# Patient Record
Sex: FEMALE | Race: WHITE | NOT HISPANIC OR LATINO | Employment: OTHER
[De-identification: names, ages, dates, MRNs, and addresses within clinical notes are randomized per-mention and may not be internally consistent; named-entity substitution may affect disease eponyms.]

---

## 2017-01-12 ENCOUNTER — RECORDS - HEALTHEAST (OUTPATIENT)
Dept: ADMINISTRATIVE | Facility: OTHER | Age: 62
End: 2017-01-12

## 2017-01-13 ENCOUNTER — HOSPITAL ENCOUNTER (OUTPATIENT)
Dept: NUCLEAR MEDICINE | Facility: HOSPITAL | Age: 62
Discharge: HOME | End: 2017-01-13
Attending: FAMILY MEDICINE

## 2017-01-13 ENCOUNTER — HOSPITAL ENCOUNTER (OUTPATIENT)
Dept: CARDIOLOGY | Facility: HOSPITAL | Age: 62
Discharge: HOME | End: 2017-01-13
Attending: FAMILY MEDICINE

## 2017-01-13 DIAGNOSIS — R06.02 EXERCISE-INDUCED SHORTNESS OF BREATH: ICD-10-CM

## 2017-01-13 LAB
CV STRESS CURRENT BP HE: NORMAL
CV STRESS CURRENT HR HE: 101
CV STRESS CURRENT HR HE: 101
CV STRESS CURRENT HR HE: 113
CV STRESS CURRENT HR HE: 115
CV STRESS CURRENT HR HE: 116
CV STRESS CURRENT HR HE: 121
CV STRESS CURRENT HR HE: 123
CV STRESS CURRENT HR HE: 127
CV STRESS CURRENT HR HE: 127
CV STRESS CURRENT HR HE: 137
CV STRESS CURRENT HR HE: 138
CV STRESS CURRENT HR HE: 144
CV STRESS CURRENT HR HE: 144
CV STRESS CURRENT HR HE: 148
CV STRESS CURRENT HR HE: 148
CV STRESS CURRENT HR HE: 151
CV STRESS CURRENT HR HE: 152
CV STRESS CURRENT HR HE: 152
CV STRESS CURRENT HR HE: 89
CV STRESS CURRENT HR HE: 89
CV STRESS CURRENT HR HE: 93
CV STRESS CURRENT HR HE: 93
CV STRESS CURRENT HR HE: 94
CV STRESS CURRENT HR HE: 96
CV STRESS CURRENT HR HE: 96
CV STRESS CURRENT HR HE: 98
CV STRESS DEVIATION TIME HE: NORMAL
CV STRESS EXERCISE STAGE HE: NORMAL
CV STRESS EXERCISE STAGE REACHED HE: NORMAL
CV STRESS FINAL RESTING BP HE: NORMAL
CV STRESS FINAL RESTING HR HE: 93
CV STRESS MAX TREADMILL GRADE HE: 12
CV STRESS MAX TREADMILL SPEED HE: 2.5
CV STRESS PEAK DIA BP HE: NORMAL
CV STRESS PEAK SYS BP HE: NORMAL
CV STRESS PHASE HE: NORMAL
CV STRESS PROTOCOL HE: NORMAL
CV STRESS RESTING PT POSITION HE: NORMAL
CV STRESS RESTING PT POSITION HE: NORMAL
CV STRESS ST DEVIATION AMOUNT HE: NORMAL
CV STRESS ST DEVIATION ELEVATION HE: NORMAL
CV STRESS ST EVELATION AMOUNT HE: NORMAL
CV STRESS TEST TYPE HE: NORMAL
CV STRESS TOTAL STAGE TIME MIN 1 HE: NORMAL
NUC STRESS EJECTION FRACTION: 76 %
STRESS ECHO BASELINE BP: NORMAL
STRESS ECHO BASELINE HR: 96
STRESS ECHO CALCULATED PERCENT HR: 95
STRESS ECHO LAST STRESS BP: NORMAL
STRESS ECHO LAST STRESS HR: 152
STRESS ECHO POST ESTIMATED WORKLOAD: 6.2
STRESS ECHO POST EXERCISE DUR MIN: 4.24
STRESS ECHO TARGET HR: 137

## 2017-01-13 RX ORDER — ATORVASTATIN CALCIUM 10 MG/1
10 TABLET, FILM COATED ORAL AT BEDTIME
Status: SHIPPED | COMMUNITY
Start: 2017-01-13 | End: 2022-06-16

## 2017-01-13 RX ORDER — ALBUTEROL SULFATE 90 UG/1
2 AEROSOL, METERED RESPIRATORY (INHALATION) EVERY 6 HOURS PRN
Status: SHIPPED | COMMUNITY
Start: 2017-01-13 | End: 2022-06-16

## 2019-09-04 ENCOUNTER — RECORDS - HEALTHEAST (OUTPATIENT)
Dept: LAB | Facility: CLINIC | Age: 64
End: 2019-09-04

## 2019-09-04 LAB
ALBUMIN SERPL-MCNC: 3.9 G/DL (ref 3.5–5)
ALP SERPL-CCNC: 54 U/L (ref 45–120)
ALT SERPL W P-5'-P-CCNC: 25 U/L (ref 0–45)
ANION GAP SERPL CALCULATED.3IONS-SCNC: 12 MMOL/L (ref 5–18)
AST SERPL W P-5'-P-CCNC: 30 U/L (ref 0–40)
BILIRUB SERPL-MCNC: 0.4 MG/DL (ref 0–1)
BUN SERPL-MCNC: 9 MG/DL (ref 8–22)
CALCIUM SERPL-MCNC: 9.2 MG/DL (ref 8.5–10.5)
CHLORIDE BLD-SCNC: 107 MMOL/L (ref 98–107)
CHOLEST SERPL-MCNC: 262 MG/DL
CO2 SERPL-SCNC: 22 MMOL/L (ref 22–31)
CREAT SERPL-MCNC: 0.83 MG/DL (ref 0.6–1.1)
FASTING STATUS PATIENT QL REPORTED: NO
GFR SERPL CREATININE-BSD FRML MDRD: >60 ML/MIN/1.73M2
GLUCOSE BLD-MCNC: 103 MG/DL (ref 70–125)
HDLC SERPL-MCNC: 88 MG/DL
LDLC SERPL CALC-MCNC: 126 MG/DL
POTASSIUM BLD-SCNC: 4.2 MMOL/L (ref 3.5–5)
PROT SERPL-MCNC: 6.6 G/DL (ref 6–8)
SODIUM SERPL-SCNC: 141 MMOL/L (ref 136–145)
T4 FREE SERPL-MCNC: 0.9 NG/DL (ref 0.7–1.8)
TRIGL SERPL-MCNC: 240 MG/DL
TSH SERPL DL<=0.005 MIU/L-ACNC: 2.14 UIU/ML (ref 0.3–5)

## 2019-09-05 ENCOUNTER — RECORDS - HEALTHEAST (OUTPATIENT)
Dept: LAB | Facility: CLINIC | Age: 64
End: 2019-09-05

## 2019-09-05 LAB — LIPASE SERPL-CCNC: 30 U/L (ref 0–52)

## 2020-02-11 ENCOUNTER — RECORDS - HEALTHEAST (OUTPATIENT)
Dept: ADMINISTRATIVE | Facility: OTHER | Age: 65
End: 2020-02-11

## 2020-02-12 ENCOUNTER — HOSPITAL ENCOUNTER (OUTPATIENT)
Dept: MAMMOGRAPHY | Facility: CLINIC | Age: 65
Discharge: HOME | End: 2020-02-12
Attending: PHYSICIAN ASSISTANT

## 2020-02-12 DIAGNOSIS — N63.42 UNSPECIFIED LUMP IN LEFT BREAST, SUBAREOLAR: ICD-10-CM

## 2020-02-12 DIAGNOSIS — N63.10 LUMP OF RIGHT BREAST: ICD-10-CM

## 2020-02-14 ENCOUNTER — HOSPITAL ENCOUNTER (OUTPATIENT)
Dept: MAMMOGRAPHY | Facility: CLINIC | Age: 65
Discharge: HOME | End: 2020-02-14
Attending: PHYSICIAN ASSISTANT

## 2020-02-14 DIAGNOSIS — N63.42 UNSPECIFIED LUMP IN LEFT BREAST, SUBAREOLAR: ICD-10-CM

## 2020-02-14 DIAGNOSIS — N63.10 LUMP OF RIGHT BREAST: ICD-10-CM

## 2021-05-28 ENCOUNTER — RECORDS - HEALTHEAST (OUTPATIENT)
Dept: ADMINISTRATIVE | Facility: CLINIC | Age: 66
End: 2021-05-28

## 2021-10-26 ENCOUNTER — LAB REQUISITION (OUTPATIENT)
Dept: LAB | Facility: CLINIC | Age: 66
End: 2021-10-26
Payer: MEDICARE

## 2021-10-26 DIAGNOSIS — E78.2 MIXED HYPERLIPIDEMIA: ICD-10-CM

## 2021-10-26 DIAGNOSIS — H61.21 IMPACTED CERUMEN, RIGHT EAR: ICD-10-CM

## 2021-10-26 DIAGNOSIS — Z01.419 ENCOUNTER FOR GYNECOLOGICAL EXAMINATION (GENERAL) (ROUTINE) WITHOUT ABNORMAL FINDINGS: ICD-10-CM

## 2021-10-26 LAB
ALBUMIN SERPL-MCNC: 3.9 G/DL (ref 3.5–5)
ALP SERPL-CCNC: 81 U/L (ref 45–120)
ALT SERPL W P-5'-P-CCNC: 23 U/L (ref 0–45)
ANION GAP SERPL CALCULATED.3IONS-SCNC: 14 MMOL/L (ref 5–18)
AST SERPL W P-5'-P-CCNC: 31 U/L (ref 0–40)
BILIRUB SERPL-MCNC: 0.4 MG/DL (ref 0–1)
BUN SERPL-MCNC: 12 MG/DL (ref 8–22)
CALCIUM SERPL-MCNC: 9.9 MG/DL (ref 8.5–10.5)
CHLORIDE BLD-SCNC: 103 MMOL/L (ref 98–107)
CHOLEST SERPL-MCNC: 267 MG/DL
CO2 SERPL-SCNC: 24 MMOL/L (ref 22–31)
CREAT SERPL-MCNC: 0.75 MG/DL (ref 0.6–1.1)
ERYTHROCYTE [DISTWIDTH] IN BLOOD BY AUTOMATED COUNT: 12.2 % (ref 10–15)
FASTING STATUS PATIENT QL REPORTED: NO
FERRITIN SERPL-MCNC: 456 NG/ML (ref 10–130)
GFR SERPL CREATININE-BSD FRML MDRD: 85 ML/MIN/1.73M2
GLUCOSE BLD-MCNC: 121 MG/DL (ref 70–125)
HCT VFR BLD AUTO: 42 % (ref 35–47)
HDLC SERPL-MCNC: 83 MG/DL
HGB BLD-MCNC: 13.7 G/DL (ref 11.7–15.7)
LDLC SERPL CALC-MCNC: 164 MG/DL
MCH RBC QN AUTO: 31.9 PG (ref 26.5–33)
MCHC RBC AUTO-ENTMCNC: 32.6 G/DL (ref 31.5–36.5)
MCV RBC AUTO: 98 FL (ref 78–100)
PLATELET # BLD AUTO: 335 10E3/UL (ref 150–450)
POTASSIUM BLD-SCNC: 4.5 MMOL/L (ref 3.5–5)
PROT SERPL-MCNC: 7.3 G/DL (ref 6–8)
RBC # BLD AUTO: 4.3 10E6/UL (ref 3.8–5.2)
SODIUM SERPL-SCNC: 141 MMOL/L (ref 136–145)
T4 FREE SERPL-MCNC: 0.85 NG/DL (ref 0.7–1.8)
TRIGL SERPL-MCNC: 102 MG/DL
TSH SERPL DL<=0.005 MIU/L-ACNC: 2.78 UIU/ML (ref 0.3–5)
WBC # BLD AUTO: 8.1 10E3/UL (ref 4–11)

## 2021-10-26 PROCEDURE — 80053 COMPREHEN METABOLIC PANEL: CPT | Mod: ORL | Performed by: NURSE PRACTITIONER

## 2021-10-26 PROCEDURE — 87624 HPV HI-RISK TYP POOLED RSLT: CPT | Mod: ORL | Performed by: NURSE PRACTITIONER

## 2021-10-26 PROCEDURE — 84443 ASSAY THYROID STIM HORMONE: CPT | Mod: ORL | Performed by: NURSE PRACTITIONER

## 2021-10-26 PROCEDURE — 85027 COMPLETE CBC AUTOMATED: CPT | Mod: ORL | Performed by: NURSE PRACTITIONER

## 2021-10-26 PROCEDURE — 84439 ASSAY OF FREE THYROXINE: CPT | Mod: ORL | Performed by: NURSE PRACTITIONER

## 2021-10-26 PROCEDURE — G0123 SCREEN CERV/VAG THIN LAYER: HCPCS | Mod: ORL | Performed by: NURSE PRACTITIONER

## 2021-10-26 PROCEDURE — 82728 ASSAY OF FERRITIN: CPT | Mod: ORL | Performed by: NURSE PRACTITIONER

## 2021-10-26 PROCEDURE — 80061 LIPID PANEL: CPT | Mod: ORL | Performed by: NURSE PRACTITIONER

## 2021-11-01 LAB
BKR LAB AP GYN ADEQUACY: NORMAL
BKR LAB AP GYN INTERPRETATION: NORMAL
BKR LAB AP HPV REFLEX: NORMAL
BKR LAB AP PREVIOUS ABNORMAL: NORMAL
HUMAN PAPILLOMA VIRUS 16 DNA: NEGATIVE
HUMAN PAPILLOMA VIRUS 18 DNA: NEGATIVE
HUMAN PAPILLOMA VIRUS FINAL DIAGNOSIS: ABNORMAL
HUMAN PAPILLOMA VIRUS OTHER HR: POSITIVE
PATH REPORT.COMMENTS IMP SPEC: NORMAL
PATH REPORT.RELEVANT HX SPEC: NORMAL

## 2022-06-16 ENCOUNTER — APPOINTMENT (OUTPATIENT)
Dept: CT IMAGING | Facility: HOSPITAL | Age: 67
End: 2022-06-16
Attending: EMERGENCY MEDICINE
Payer: MEDICARE

## 2022-06-16 ENCOUNTER — HOSPITAL ENCOUNTER (OUTPATIENT)
Facility: HOSPITAL | Age: 67
Setting detail: OBSERVATION
LOS: 1 days | Discharge: HOME | End: 2022-06-17
Attending: EMERGENCY MEDICINE | Admitting: INTERNAL MEDICINE
Payer: MEDICARE

## 2022-06-16 DIAGNOSIS — T17.908A ASPIRATION OF FOREIGN BODY, INITIAL ENCOUNTER: Primary | ICD-10-CM

## 2022-06-16 DIAGNOSIS — T17.908A ASPIRATION INTO AIRWAY, INITIAL ENCOUNTER: ICD-10-CM

## 2022-06-16 LAB
ANION GAP SERPL CALCULATED.3IONS-SCNC: 11 MMOL/L (ref 5–18)
BASOPHILS # BLD AUTO: 0 10E3/UL (ref 0–0.2)
BASOPHILS NFR BLD AUTO: 1 %
BUN SERPL-MCNC: 9 MG/DL (ref 8–22)
CALCIUM SERPL-MCNC: 9.5 MG/DL (ref 8.5–10.5)
CHLORIDE BLD-SCNC: 104 MMOL/L (ref 98–107)
CO2 SERPL-SCNC: 26 MMOL/L (ref 22–31)
CREAT SERPL-MCNC: 0.76 MG/DL (ref 0.6–1.1)
EOSINOPHIL # BLD AUTO: 0 10E3/UL (ref 0–0.7)
EOSINOPHIL NFR BLD AUTO: 1 %
ERYTHROCYTE [DISTWIDTH] IN BLOOD BY AUTOMATED COUNT: 12.5 % (ref 10–15)
GFR SERPL CREATININE-BSD FRML MDRD: 87 ML/MIN/1.73M2
GLUCOSE BLD-MCNC: 105 MG/DL (ref 70–125)
HCT VFR BLD AUTO: 43.1 % (ref 35–47)
HGB BLD-MCNC: 14 G/DL (ref 11.7–15.7)
IMM GRANULOCYTES # BLD: 0 10E3/UL
IMM GRANULOCYTES NFR BLD: 1 %
INR PPP: 0.98 (ref 0.85–1.15)
LYMPHOCYTES # BLD AUTO: 1.5 10E3/UL (ref 0.8–5.3)
LYMPHOCYTES NFR BLD AUTO: 22 %
MCH RBC QN AUTO: 31.3 PG (ref 26.5–33)
MCHC RBC AUTO-ENTMCNC: 32.5 G/DL (ref 31.5–36.5)
MCV RBC AUTO: 96 FL (ref 78–100)
MONOCYTES # BLD AUTO: 0.6 10E3/UL (ref 0–1.3)
MONOCYTES NFR BLD AUTO: 9 %
NEUTROPHILS # BLD AUTO: 4.6 10E3/UL (ref 1.6–8.3)
NEUTROPHILS NFR BLD AUTO: 66 %
NRBC # BLD AUTO: 0 10E3/UL
NRBC BLD AUTO-RTO: 0 /100
PLATELET # BLD AUTO: 298 10E3/UL (ref 150–450)
POTASSIUM BLD-SCNC: 4.2 MMOL/L (ref 3.5–5)
RBC # BLD AUTO: 4.48 10E6/UL (ref 3.8–5.2)
SARS-COV-2 RNA RESP QL NAA+PROBE: NEGATIVE
SODIUM SERPL-SCNC: 141 MMOL/L (ref 136–145)
WBC # BLD AUTO: 6.8 10E3/UL (ref 4–11)

## 2022-06-16 PROCEDURE — 85610 PROTHROMBIN TIME: CPT | Performed by: EMERGENCY MEDICINE

## 2022-06-16 PROCEDURE — G0378 HOSPITAL OBSERVATION PER HR: HCPCS

## 2022-06-16 PROCEDURE — 85025 COMPLETE CBC W/AUTO DIFF WBC: CPT | Performed by: EMERGENCY MEDICINE

## 2022-06-16 PROCEDURE — 250N000011 HC RX IP 250 OP 636: Performed by: EMERGENCY MEDICINE

## 2022-06-16 PROCEDURE — 71250 CT THORAX DX C-: CPT

## 2022-06-16 PROCEDURE — 99219 PR INITIAL OBSERVATION CARE,LEVEL II: CPT | Performed by: INTERNAL MEDICINE

## 2022-06-16 PROCEDURE — 250N000013 HC RX MED GY IP 250 OP 250 PS 637: Performed by: INTERNAL MEDICINE

## 2022-06-16 PROCEDURE — 96365 THER/PROPH/DIAG IV INF INIT: CPT

## 2022-06-16 PROCEDURE — 87635 SARS-COV-2 COVID-19 AMP PRB: CPT | Performed by: EMERGENCY MEDICINE

## 2022-06-16 PROCEDURE — C9803 HOPD COVID-19 SPEC COLLECT: HCPCS

## 2022-06-16 PROCEDURE — 250N000011 HC RX IP 250 OP 636: Performed by: INTERNAL MEDICINE

## 2022-06-16 PROCEDURE — 99285 EMERGENCY DEPT VISIT HI MDM: CPT | Mod: 25

## 2022-06-16 PROCEDURE — 99204 OFFICE O/P NEW MOD 45 MIN: CPT | Performed by: INTERNAL MEDICINE

## 2022-06-16 PROCEDURE — 80048 BASIC METABOLIC PNL TOTAL CA: CPT | Performed by: EMERGENCY MEDICINE

## 2022-06-16 PROCEDURE — 96375 TX/PRO/DX INJ NEW DRUG ADDON: CPT | Mod: XU

## 2022-06-16 PROCEDURE — 36415 COLL VENOUS BLD VENIPUNCTURE: CPT | Performed by: EMERGENCY MEDICINE

## 2022-06-16 RX ORDER — NICOTINE 21 MG/24HR
1 PATCH, TRANSDERMAL 24 HOURS TRANSDERMAL DAILY
Status: DISCONTINUED | OUTPATIENT
Start: 2022-06-16 | End: 2022-06-17 | Stop reason: HOSPADM

## 2022-06-16 RX ORDER — ACETAMINOPHEN 650 MG/1
650 SUPPOSITORY RECTAL EVERY 6 HOURS PRN
Status: DISCONTINUED | OUTPATIENT
Start: 2022-06-16 | End: 2022-06-17 | Stop reason: HOSPADM

## 2022-06-16 RX ORDER — ONDANSETRON 2 MG/ML
4 INJECTION INTRAMUSCULAR; INTRAVENOUS EVERY 6 HOURS PRN
Status: DISCONTINUED | OUTPATIENT
Start: 2022-06-16 | End: 2022-06-17 | Stop reason: HOSPADM

## 2022-06-16 RX ORDER — AMOXICILLIN 250 MG
1 CAPSULE ORAL 2 TIMES DAILY
Status: DISCONTINUED | OUTPATIENT
Start: 2022-06-16 | End: 2022-06-17 | Stop reason: HOSPADM

## 2022-06-16 RX ORDER — HYDRALAZINE HYDROCHLORIDE 20 MG/ML
10 INJECTION INTRAMUSCULAR; INTRAVENOUS EVERY 6 HOURS PRN
Status: DISCONTINUED | OUTPATIENT
Start: 2022-06-16 | End: 2022-06-17 | Stop reason: HOSPADM

## 2022-06-16 RX ORDER — ACETAMINOPHEN 325 MG/1
650 TABLET ORAL EVERY 6 HOURS PRN
Status: DISCONTINUED | OUTPATIENT
Start: 2022-06-16 | End: 2022-06-17 | Stop reason: HOSPADM

## 2022-06-16 RX ORDER — CEFTRIAXONE 1 G/1
1 INJECTION, POWDER, FOR SOLUTION INTRAMUSCULAR; INTRAVENOUS ONCE
Status: COMPLETED | OUTPATIENT
Start: 2022-06-16 | End: 2022-06-16

## 2022-06-16 RX ORDER — LORAZEPAM 0.5 MG/1
0.5 TABLET ORAL EVERY 4 HOURS PRN
Status: DISCONTINUED | OUTPATIENT
Start: 2022-06-16 | End: 2022-06-17 | Stop reason: HOSPADM

## 2022-06-16 RX ORDER — ONDANSETRON 4 MG/1
4 TABLET, ORALLY DISINTEGRATING ORAL EVERY 6 HOURS PRN
Status: DISCONTINUED | OUTPATIENT
Start: 2022-06-16 | End: 2022-06-17 | Stop reason: HOSPADM

## 2022-06-16 RX ORDER — AMOXICILLIN 250 MG
2 CAPSULE ORAL 2 TIMES DAILY
Status: DISCONTINUED | OUTPATIENT
Start: 2022-06-16 | End: 2022-06-17 | Stop reason: HOSPADM

## 2022-06-16 RX ADMIN — HYDRALAZINE HYDROCHLORIDE 10 MG: 20 INJECTION INTRAMUSCULAR; INTRAVENOUS at 19:13

## 2022-06-16 RX ADMIN — SENNOSIDES AND DOCUSATE SODIUM 1 TABLET: 8.6; 5 TABLET ORAL at 21:55

## 2022-06-16 RX ADMIN — LORAZEPAM 0.5 MG: 0.5 TABLET ORAL at 19:13

## 2022-06-16 RX ADMIN — CEFTRIAXONE SODIUM 1 G: 1 INJECTION, POWDER, FOR SOLUTION INTRAMUSCULAR; INTRAVENOUS at 17:06

## 2022-06-16 ASSESSMENT — ENCOUNTER SYMPTOMS
FEVER: 0
CHILLS: 0
HEADACHES: 0
EYE PAIN: 0
DIARRHEA: 0
VOMITING: 0
SORE THROAT: 0
COUGH: 1

## 2022-06-16 NOTE — PLAN OF CARE
Problem: Plan of Care - These are the overarching goals to be used throughout the patient stay.    Goal: Absence of Hospital-Acquired Illness or Injury  Intervention: Identify and Manage Fall Risk  Recent Flowsheet Documentation  Taken 6/16/2022 1829 by Ching Montoya, RN  Safety Promotion/Fall Prevention: nonskid shoes/slippers when out of bed  Intervention: Prevent and Manage VTE (Venous Thromboembolism) Risk  Recent Flowsheet Documentation  Taken 6/16/2022 1829 by Ching Montoya RN  VTE Prevention/Management: SCDs (sequential compression devices) off     Problem: Gas Exchange Impaired  Goal: Optimal Gas Exchange  Outcome: Ongoing, Progressing  Goal Outcome Evaluation:  Pt has an expiratory wheeze, denies shortness of breath or pain.  BP is elevated to 204/102 and HR is 102.  Pt denies history of hypertension.  Text to Dr. Brown.

## 2022-06-16 NOTE — ED NOTES
Pt alert and oriented, denies pain.Dinner order called in. Pt advised on NPO status effective midnight.

## 2022-06-16 NOTE — H&P
Admission History and Physical   Gloria Webb,  1955, MRN [id number]      Aspiration into airway, initial encounter [T17.908A]    PCP: Netta Mares, [unfilled], [phone]   Code status:  No Order       Extended Emergency Contact Information  Primary Emergency Contact: Reese Andrade   [facility]  Home Phone: [phone]  Relation: Significant other       Assessment and Plan     Assessment:  Foreign body aspiration in the bronchus intermedius  --First episode as per the patient  -- Pulmonary consulted for bronchoscopy  -- Will need bronchoscopy in the OR  -- Wheezing secondary to foreign body  -- No evidence of hypoxia  -- Rocephin given in the ER    Accelerated hypertension likely due to anxiety  -Continue to monitor    Smoker  -- Ordered as needed nicotine patch    Addendum Jadiel Brown MD, Hospitalist,22,7:00 PM  Blood pressure was elevated about 200 and therefore ordered IV hydralazine as needed for systolic blood pressure more than 190.  In addition, patient appeared anxious in the ER and therefore ordered as needed Ativan for the same.    Plan:    Pt would be admitted as observation and will likely stay for less than 2 midnights.    Precautions: Aspiration    Nutrition: Regular, n.p.o. after midnight    Activity: As tolerated    IV fluids: None    Antibiotics:  Rocephin given in the ER    DVT prophylaxis: None    GI prophylaxis: Non    Consult: Pulmonary for bronchoscopy    Monitor: Hypoxia    Labs: INR next a.m.    Imaging: CT chest result reviewed      Total unit/floor time 55 minutes.      Chief Complaint:  Sent by PCP due to foreign body on chest x-ray     HPI:    Informant is patient reliable  Gloria Webb is a 66 year old old female who was sent by PCP due to aspiration of corn kernel that happened 2 days ago.  No previous episodes of aspiration as per self-report.  Patient was eating canned corn and talking at the same time.  She tried coughing without relief.  She went to  PCP where chest x-ray showed foreign body.  In the ED potation the CT chest which confirmed foreign body in the bronchus intermedius.  Pulmonary was consulted and would need bronchoscopy next a.m. in the OR.  1 dose of Rocephin given and patient will be n.p.o. after midnight.  As per self-report patient does not take any medication                     Medical History      Past Medical History:   Diagnosis Date     Asthma      High cholesterol       Family History  Reviewed, and           Allergies  No Known Allergies Social History  Reviewed, and she  reports that she has been smoking. She has a 66.00 pack-year smoking history. She does not have any smokeless tobacco history on file.    Review of Systems:  10-point ROS negative, except as noted in HPI            Prior to Admission Medications   (Not in a hospital admission)         Physical Exam:  Temp:  [97.7  F (36.5  C)] 97.7  F (36.5  C)  Pulse:  [86-89] 89  Resp:  [14-22] 14  BP: (177-189)/() 188/99  SpO2:  [97 %] 97 %  Wt Readings from Last 5 Encounters:   06/16/22 59.4 kg (131 lb)     Body mass index is 25.58 kg/m .  Alert, oriented*3  No pallor, icterus, clubbing, cyanosis  Well-nourished  No sinus tenderness  Appears anxious  Neck supple, but thin  CVS: S1 S2-N, no murmurs, gallops, rubs  Resp: Right-sided wheezing Abd: soft, No t/g/r  Neuro: no involuntary movements such as tremors  Vasc: no leg edema  No clubbing  Skin--no generalized skin rash     Pertinent Labs  Lab Results:  Recent Results (from the past 24 hour(s))   Basic metabolic panel    Collection Time: 06/16/22 11:26 AM   Result Value Ref Range    Sodium 141 136 - 145 mmol/L    Potassium 4.2 3.5 - 5.0 mmol/L    Chloride 104 98 - 107 mmol/L    Carbon Dioxide (CO2) 26 22 - 31 mmol/L    Anion Gap 11 5 - 18 mmol/L    Urea Nitrogen 9 8 - 22 mg/dL    Creatinine 0.76 0.60 - 1.10 mg/dL    Calcium 9.5 8.5 - 10.5 mg/dL    Glucose 105 70 - 125 mg/dL    GFR Estimate 87 >60 mL/min/1.73m2   INR     Collection Time: 06/16/22 11:26 AM   Result Value Ref Range    INR 0.98 0.85 - 1.15   CBC with platelets and differential    Collection Time: 06/16/22 11:26 AM   Result Value Ref Range    WBC Count 6.8 4.0 - 11.0 10e3/uL    RBC Count 4.48 3.80 - 5.20 10e6/uL    Hemoglobin 14.0 11.7 - 15.7 g/dL    Hematocrit 43.1 35.0 - 47.0 %    MCV 96 78 - 100 fL    MCH 31.3 26.5 - 33.0 pg    MCHC 32.5 31.5 - 36.5 g/dL    RDW 12.5 10.0 - 15.0 %    Platelet Count 298 150 - 450 10e3/uL    % Neutrophils 66 %    % Lymphocytes 22 %    % Monocytes 9 %    % Eosinophils 1 %    % Basophils 1 %    % Immature Granulocytes 1 %    NRBCs per 100 WBC 0 <1 /100    Absolute Neutrophils 4.6 1.6 - 8.3 10e3/uL    Absolute Lymphocytes 1.5 0.8 - 5.3 10e3/uL    Absolute Monocytes 0.6 0.0 - 1.3 10e3/uL    Absolute Eosinophils 0.0 0.0 - 0.7 10e3/uL    Absolute Basophils 0.0 0.0 - 0.2 10e3/uL    Absolute Immature Granulocytes 0.0 <=0.4 10e3/uL    Absolute NRBCs 0.0 10e3/uL     No results found for: HGBA1C  No results found for: IRON  No results found for: CKTOTAL, CKMB, TROPONINI  Lab Results   Component Value Date    TSH 2.78 10/26/2021       Pertinent Radiology  Radiology Results:  CT Chest w/o Contrast    Result Date: 6/16/2022  EXAM: CT CHEST W/O CONTRAST LOCATION: [facility] DATE/TIME: 6/16/2022 2:28 PM INDICATION: Corn kernel aspiration 2 days ago. Cough. COMPARISON: Chest x-ray today. TECHNIQUE: CT chest without IV contrast. Multiplanar reformats were obtained. Dose reduction techniques were used. CONTRAST: None. FINDINGS: LUNGS AND PLEURA: The bronchus intermedius contains a 6 x 5 x 2 mm soft tissue foreign density just proximal to the bifurcation of the middle and lower lobe bronchi. This could represent an aspirated kernel of corn. This does not completely obstruct the airway. Mild right middle and lower lobe atelectasis or scar. No effusion. No pneumonia. MEDIASTINUM/AXILLAE: No lymphadenopathy. No thoracic aortic  aneurysm. CORONARY ARTERY CALCIFICATION: Mild. UPPER ABDOMEN: Diffuse hepatic steatosis. Aortic atherosclerosis. MUSCULOSKELETAL: Unremarkable.     IMPRESSION: 1.  Bronchus intermedius 6 mm soft tissue foreign body consistent with aspirated fluid. 2.  Mild right basilar atelectasis. 3.  Hepatic steatosis.       EKG Results: not reviewed.   Echo: No results found.

## 2022-06-16 NOTE — ED NOTES
[facility] ED Handoff Report    ED Chief Complaint: Aspiration    ED Diagnosis:  (T17.908A) Aspiration into airway, initial encounter      PMH:    Past Medical History:   Diagnosis Date     Asthma      High cholesterol         Code Status:  No Order     Falls Risk: Yes Band: Applied    Current Living Situation/Residence: lives with a significant other and lives in a house     Elimination Status: Continent: Yes     Activity Level: Independent    Patients Preferred Language:  English     Needed: No    Vital Signs:  BP (!) 177/91   Pulse 89   Temp 97.7  F (36.5  C) (Tympanic)   Resp 22   Wt 59.4 kg (131 lb)   SpO2 97%   BMI 25.58 kg/m         Pain Score: 0/10    Is the Patient Confused:  No    Last Food or Drink: 06/16/22 at 0730 cup of coffee last meal yesterday at 1700    Focused Assessment:  Expiratory wheezing and stridor bilateral. Alert and oriented x4 able to  Verbalize needs appropriately. Independent with ambulation. Pt stated she aspirated on corn kernel.     Tests Performed: Done: Labs and Imaging    Treatments Provided:  rest    Family Dynamics/Concerns: No    Family Updated On Visitor Policy: Yes    Plan of Care Communicated to Family: Yes    Who Was Updated about Plan of Care: pt will update significant other    Belongings Checklist Done and Signed by Patient: Yes    Medications sent with patient: N/A    Covid: asymptomatic , pending      RN: Kasey Jon RN 6/16/2022 3:50 PM

## 2022-06-16 NOTE — ED TRIAGE NOTES
Patient arrives by private car from clinic for evaluation of aspiration of corn.  Patient reports that 2 days ago she aspirated a kernal of corn, went to PCP and chest xray shows concerning area in left mid lobe- advised to come here for pulmonary referral.

## 2022-06-16 NOTE — CONSULTS
[facility] Pulmonary/Critical Care Consult Team Note    Gloria Webb,  1955, MRN [id number]  Admitting Dx: Aspiration into airway, initial encounter [T17.908A]  Date / Time of Admission:  2022  2:22 PM    Recent Events:    3 days ago she was eating and she felt a piece of corn go down her windpipe. She immediately coughed for what felt like forever. She then went home and had intermittent violent coughing jags. She tried to sleep and it kept happening and waking her up. She denies any hemoptysis. The coughing spells are fever now, but still present.   She has no Hx of Asthma (however it is documented in chart)  She was a smoker for 60 pack years, has since quit.  She has not had any wheezing    Assessment/Plan: Gloria Webb is a 66 year old female with PMHx of tobacco use who aspirated a piece of corn 3 days ago and is having coughing.    PULM: Foreign Body aspiration in lungs  - will need to have procedure intubated due to the likely need for multiple passes and prolonged case given the food was aspirated 3 days ago  - NPO overnight  - requested OR time  - if a bed becomes available in the ICU will transfer and do the procedure here        Medical Care Time excluding procedures and family discussions greater than: 1 Hour    Risk Factors Present on Admission:  Clinically Significant Risk Factors Present on Admission                           Cecile El DO  Pulmonary and Critical Care Attending  pgr [phone]    No Known Allergies    Meds: See MAR    Physical Exam:  BP (!) 177/91   Pulse 89   Temp 97.7  F (36.5  C) (Tympanic)   Resp 22   Wt 59.4 kg (131 lb)   SpO2 97%   BMI 25.58 kg/m    Intake/Output this shift:  No intake/output data recorded.  GEN: sitting up in bed, no distress  HEENT: MMM  CVS: regular rhythm, no murmurs  RESP: CTA BL, no whonchi, no wheezing  ABD: Soft, No abdominal pain with palpation, no guarding, no rigidity  EXT: Warm, well perfused, no edema  NEURO:  Moving  all extremities  PSYCH: pleasant    Pertinent Labs: Latest lab results in EHR personally reviewed.   CMP  Recent Labs   Lab 06/16/22  1126      POTASSIUM 4.2   CHLORIDE 104   CO2 26   ANIONGAP 11      BUN 9   CR 0.76   GFRESTIMATED 87   ZACK 9.5     CBC  Recent Labs   Lab 06/16/22  1126   WBC 6.8   RBC 4.48   HGB 14.0   HCT 43.1   MCV 96   MCH 31.3   MCHC 32.5   RDW 12.5        INR  Recent Labs   Lab 06/16/22  1126   INR 0.98     Arterial Blood GasNo lab results found in last 7 days.    Cultures: not yet available.    Imaging: personally reviewed.   Results for orders placed or performed during the hospital encounter of 06/16/22   CT Chest w/o Contrast    Narrative    EXAM: CT CHEST W/O CONTRAST  LOCATION: [facility]  DATE/TIME: 6/16/2022 2:28 PM    INDICATION: Corn kernel aspiration 2 days ago. Cough.  COMPARISON: Chest x-ray today.  TECHNIQUE: CT chest without IV contrast. Multiplanar reformats were obtained. Dose reduction techniques were used.  CONTRAST: None.    FINDINGS:   LUNGS AND PLEURA: The bronchus intermedius contains a 6 x 5 x 2 mm soft tissue foreign density just proximal to the bifurcation of the middle and lower lobe bronchi. This could represent an aspirated kernel of corn. This does not completely obstruct the   airway.    Mild right middle and lower lobe atelectasis or scar. No effusion. No pneumonia.    MEDIASTINUM/AXILLAE: No lymphadenopathy. No thoracic aortic aneurysm.    CORONARY ARTERY CALCIFICATION: Mild.    UPPER ABDOMEN: Diffuse hepatic steatosis. Aortic atherosclerosis.    MUSCULOSKELETAL: Unremarkable.      Impression    IMPRESSION:   1.  Bronchus intermedius 6 mm soft tissue foreign body consistent with aspirated fluid.  2.  Mild right basilar atelectasis.  3.  Hepatic steatosis.         Patient Active Problem List   Diagnosis     Aspiration into airway, initial encounter       Cecile El DO  Pulmonary and Critical Care Attending  pgr  [phone]

## 2022-06-16 NOTE — PHARMACY-ADMISSION MEDICATION HISTORY
Pharmacy Note - Admission Medication History    Pertinent Provider Information: none     ______________________________________________________________________    Prior To Admission (PTA) med list completed and updated in EMR.       No outpatient medications have been marked as taking for the 6/16/22 encounter (Hospital Encounter).       Information source(s): Patient, Clinic records and [facility]  Method of interview communication: in-person    Summary of Changes to PTA Med List  New: none  Discontinued: had been on atorvastatin in the past  Changed: none    Patient was asked about OTC/herbal products specifically.  PTA med list reflects this.    Allergies were reviewed, assessed, and updated with the patient.      Patient does not use any multi-dose medications prior to admission.    The information provided in this note is only as accurate as the sources available at the time of the update(s).    Thank you for the opportunity to participate in the care of this patient.    Haseeb Travis STEVE  6/16/2022 4:55 PM

## 2022-06-16 NOTE — ED PROVIDER NOTES
EMERGENCY DEPARTMENT ENCOUNTER      NAME: Gloria Webb  AGE: 66 year old female  YOB: 1955  MRN: [id number]  EVALUATION DATE & TIME: No admission date for patient encounter.    PCP: Netta Mares    ED PROVIDER: Debra French M.D.      CHIEF COMPLAINT     Chief Complaint   Patient presents with     Aspiration         FINAL IMPRESSION:     1. Aspiration into airway, initial encounter          MEDICAL DECISION MAKING:       Pertinent Labs & Imaging studies reviewed. (See chart for details)    66 year old female presents to the Emergency Department for evaluation of foreign aspiration.    3 days ago patient was eating loose coronary and aspirated.  She was seen at her clinic kindly noticed that it was a foreign body on her chest x-ray sent here for evaluation.    Except For coughing patient denies any other complaints.    Examination respiratory distress.  Decreased breath sounds on the right.    Spoke with pulmonology doctor, requesting CT chest.  CT chest does reveal a foreign body.  She recommends admission n.p.o. after midnight for scope in the morning.    Patient updated in agreement.    Dr. Johnson will call admitting physician.           Differential Diagnosis (include but not limited to)  Aspiration, pneumonia, pneumothorax, among others      Vital Signs: Hypertension  EKG: None  Imaging: CT chest for eval  Home Meds: Reviewed  ED meds/abx: None  Fluids:    Labs  K 4.2  Cr 0.76  Wbc 6.8  Hgb 14  Platelets 298        Review of Previous Records  6/16/2022 patient presents to [facility] after inhaling a kernel of corn last night. Reports wheezing.      Consults  Dr. Mckenzie - Pulmonologist Intensivist    ED COURSE    11:05 met with patient at triage.  Discussed with her to plan.  She is in agreement    12:07 PM updated on results.    Spoke with pulmonology is agrees with CT.    3:28 PM patient updated of the plan.  At the conclusion of the encounter I discussed the results  "of all of the tests and the disposition. The questions were answered. The patient and no one else acknowledged understanding and was agreeable with the care plan.           MEDICATIONS GIVEN IN THE EMERGENCY:   Medications - No data to display    NEW PRESCRIPTIONS STARTED AT TODAY'S ER VISIT     New Prescriptions    No medications on file          =================================================================    HPI     Patient information was obtained from: Patient    Use of : N/A      Gloria Webb is a 66 year old female who presents by private ar for evaluation of an aspiration issue.    The patient presents stating that a corn kernel is stuck in her right lung. 2 days ago (6/14), the patient was eating canned corn, when she seemingly breathed a kernel in. She began coughing with no help. Eventually, she was able to calm down, and after a couple hours went to bed. The patient woke in the middle of the night coughing, which persisted for the whole night. Around 0500, she was able to doze off, and woke up feeling fine. Late afternoon yesterday, she again began to cough. She presented to [facility], where she received an Xray. Her coughing is mainly in the right side lung, but now she mainly reports \"soreness\" to the bottom of her lungs from coughing.    The patient denies medical problems, high blood pressure, diabetes mellitus type 2, allergies. She is vaccinated against COVID.    The patient denies vomiting, diarrhea, fever, chills, headache, eye pain, sore throat, or any other complaints at this time.    REVIEW OF SYSTEMS   Review of Systems   Constitutional: Negative for chills and fever.   HENT: Negative for sore throat.    Eyes: Negative for pain.   Respiratory: Positive for cough.         Positive for pain to base of lungs from coughing   Gastrointestinal: Negative for diarrhea and vomiting.   Neurological: Negative for headaches.   All other systems reviewed and are " negative.       PAST MEDICAL HISTORY:     Past Medical History:   Diagnosis Date     Asthma      High cholesterol        PAST SURGICAL HISTORY:   History reviewed. No pertinent surgical history.      CURRENT MEDICATIONS:   albuterol (PROVENTIL HFA;VENTOLIN HFA) 90 mcg/actuation inhaler  atorvastatin (LIPITOR) 10 MG tablet  ondansetron (ZOFRAN-ODT) 8 MG disintegrating tablet         ALLERGIES:   No Known Allergies    FAMILY HISTORY:     Family History   Problem Relation Age of Onset     Breast Cancer No family hx of        SOCIAL HISTORY:     Social History     Socioeconomic History     Marital status: Single   Tobacco Use     Smoking status: Current Every Day Smoker     Packs/day: 1.50     Years: 44.00     Pack years: 66.00       VITALS:   BP (!) 177/91   Pulse 89   Temp 97.7  F (36.5  C) (Tympanic)   Resp 22   Wt 59.4 kg (131 lb)   SpO2 97%   BMI 25.58 kg/m      PHYSICAL EXAM     Physical Exam  Vitals and nursing note reviewed. Exam conducted with a chaperone present.   Constitutional:       Appearance: Normal appearance.   Neurological:      Mental Status: She is alert.         Physical Exam   Constitutional: Well appearing, cooperative, pleasant    Head: Atraumatic.     Nose: Nose normal.     Mouth/Throat: Oropharynx is clear and moist.     Eyes: EOM are normal. Pupils are equal, round, and reactive to light.     Ears: No erythema    Neck: Normal range of motion. Neck supple.     Cardiovascular: Normal rate, regular rhythm and normal heart sounds.      Pulmonary/Chest: Normal effort. Coarse breathing sounds on right side.    Abdominal: soft, non tender    Musculoskeletal: Normal range of motion.     Neurological: No deficits    Lymphatics: No edema    : NA    Skin: Skin is warm and dry.     Psychiatric: Normal mood and affect. Behavior is normal.       LAB:     All pertinent labs reviewed and interpreted.  Labs Ordered and Resulted from Time of ED Arrival to Time of ED Departure   BASIC METABOLIC PANEL -  Normal       Result Value    Sodium 141      Potassium 4.2      Chloride 104      Carbon Dioxide (CO2) 26      Anion Gap 11      Urea Nitrogen 9      Creatinine 0.76      Calcium 9.5      Glucose 105      GFR Estimate 87     INR - Normal    INR 0.98     CBC WITH PLATELETS AND DIFFERENTIAL    WBC Count 6.8      RBC Count 4.48      Hemoglobin 14.0      Hematocrit 43.1      MCV 96      MCH 31.3      MCHC 32.5      RDW 12.5      Platelet Count 298      % Neutrophils 66      % Lymphocytes 22      % Monocytes 9      % Eosinophils 1      % Basophils 1      % Immature Granulocytes 1      NRBCs per 100 WBC 0      Absolute Neutrophils 4.6      Absolute Lymphocytes 1.5      Absolute Monocytes 0.6      Absolute Eosinophils 0.0      Absolute Basophils 0.0      Absolute Immature Granulocytes 0.0      Absolute NRBCs 0.0     COVID-19 VIRUS (CORONAVIRUS) BY PCR        RADIOLOGY:     Reviewed all pertinent imaging. Please see official radiology report.  CT Chest w/o Contrast   Final Result   IMPRESSION:    1.  Bronchus intermedius 6 mm soft tissue foreign body consistent with aspirated fluid.   2.  Mild right basilar atelectasis.   3.  Hepatic steatosis.              EKG:       I have independently reviewed and interpreted the EKG(s) documented above.      PROCEDURES:     Procedures      I, Julienne Hatch, am serving as a scribe to document services personally performed by Dr. French based on my observation and the provider's statements to me. I, Debra French MD attest that Julienne Hatch is acting in a scribe capacity, has observed my performance of the services and has documented them in accordance with my direction.    Debra French M.D.  Emergency Medicine  [facility] EMERGENCY DEPARTMENT  [facility] [facility] [id number] [phone]  Dept: [phone]       Debra French MD  06/16/22 [id number]

## 2022-06-16 NOTE — ED NOTES
"EMERGENCY DEPARTMENT SIGN OUT NOTE        ED COURSE AND MEDICAL DECISION MAKING  Patient was signed out to me by Dr Debra French at 3:15 PM.    In brief, Gloria Webb is a 66 year old female who initially presented for aspiration. On 6/14/2022 (2 days ago), patient was eating canned corn and thought she breathed a kernel in. Tried coughing without relief. She was able to calm down and went to bed. Patient then woke up in the middle of the night coughing which then persisted throughout the night. At 5:00 AM yesterday, she was able to doze off and then awoke feeling fine. Late afternoon yesterday, she began to cough. She presented to [facility] where she had an X-ray done. Reports that coughing is mainly on the right side and endorses \"soreness\" to the bottom of her lungs from coughing.      At time of sign out, disposition was pending bed placement.     Started some antibiotics, ceftriaxone after consulting with pulmonology.  Patient will be admitted and kept n.p.o. at midnight for bronchoscopy tomorrow.  No other events throughout her ED course.    4:02 PM Spoke with the pulmonologist, Dr. Arias.    4:13 PM Spoke with the hospitalist, Dr. Brown.        FINAL IMPRESSION    1. Aspiration of foreign body, initial encounter    2. Aspiration into airway, initial encounter        ED MEDS  Medications   melatonin tablet 1 mg (has no administration in time range)   ondansetron (ZOFRAN ODT) ODT tab 4 mg (has no administration in time range)     Or   ondansetron (ZOFRAN) injection 4 mg (has no administration in time range)   acetaminophen (TYLENOL) tablet 650 mg (has no administration in time range)     Or   acetaminophen (TYLENOL) Suppository 650 mg (has no administration in time range)   senna-docusate (SENOKOT-S/PERICOLACE) 8.6-50 MG per tablet 1 tablet (1 tablet Oral Given 6/16/22 2155)     Or   senna-docusate (SENOKOT-S/PERICOLACE) 8.6-50 MG per tablet 2 tablet ( Oral See Alternative 6/16/22 2155) "   nicotine Patch in Place ( Transdermal Patch Free Period 6/16/22 1914)   nicotine (NICODERM CQ) 14 MG/24HR 24 hr patch 1 patch (1 patch Transdermal Not Given 6/16/22 1912)   hydrALAZINE (APRESOLINE) injection 10 mg (10 mg Intravenous Given 6/16/22 1913)   LORazepam (ATIVAN) tablet 0.5 mg (0.5 mg Oral Given 6/16/22 1913)   cefTRIAXone (ROCEPHIN) 1 g vial to attach to  mL bag for ADULTS or NS 50 mL bag for PEDS (1 g Intravenous New Bag 6/16/22 1706)       LAB  Labs Ordered and Resulted from Time of ED Arrival to Time of ED Departure   BASIC METABOLIC PANEL - Normal       Result Value    Sodium 141      Potassium 4.2      Chloride 104      Carbon Dioxide (CO2) 26      Anion Gap 11      Urea Nitrogen 9      Creatinine 0.76      Calcium 9.5      Glucose 105      GFR Estimate 87     INR - Normal    INR 0.98     CBC WITH PLATELETS AND DIFFERENTIAL    WBC Count 6.8      RBC Count 4.48      Hemoglobin 14.0      Hematocrit 43.1      MCV 96      MCH 31.3      MCHC 32.5      RDW 12.5      Platelet Count 298      % Neutrophils 66      % Lymphocytes 22      % Monocytes 9      % Eosinophils 1      % Basophils 1      % Immature Granulocytes 1      NRBCs per 100 WBC 0      Absolute Neutrophils 4.6      Absolute Lymphocytes 1.5      Absolute Monocytes 0.6      Absolute Eosinophils 0.0      Absolute Basophils 0.0      Absolute Immature Granulocytes 0.0      Absolute NRBCs 0.0           RADIOLOGY    CT Chest w/o Contrast   Final Result   IMPRESSION:    1.  Bronchus intermedius 6 mm soft tissue foreign body consistent with aspirated fluid.   2.  Mild right basilar atelectasis.   3.  Hepatic steatosis.             DISCHARGE MEDS  There are no discharge medications for this patient.    I, Rachel Pascual, am serving as a scribe to document services personally performed by Reese Johnson MD, based on my observations and the provider's statements to me.  I, Reese Johnson MD, attest that Rachel Pascual is acting in a scribe capacity,  has observed my performance of the services and has documented them in accordance with my direction.    Reese Johnson MD  [facility] EMERGENCY DEPARTMENT  [address] [place] [id number] [phone]     Reese Johnson MD  06/17/22 0104

## 2022-06-17 ENCOUNTER — ANESTHESIA EVENT (OUTPATIENT)
Dept: SURGERY | Facility: HOSPITAL | Age: 67
End: 2022-06-17
Payer: MEDICARE

## 2022-06-17 ENCOUNTER — ANESTHESIA (OUTPATIENT)
Dept: SURGERY | Facility: HOSPITAL | Age: 67
End: 2022-06-17
Payer: MEDICARE

## 2022-06-17 VITALS
TEMPERATURE: 97.7 F | WEIGHT: 131 LBS | BODY MASS INDEX: 25.72 KG/M2 | RESPIRATION RATE: 16 BRPM | HEIGHT: 60 IN | DIASTOLIC BLOOD PRESSURE: 86 MMHG | HEART RATE: 91 BPM | SYSTOLIC BLOOD PRESSURE: 135 MMHG | OXYGEN SATURATION: 92 %

## 2022-06-17 LAB
HOLD SPECIMEN: NORMAL
HOLD SPECIMEN: NORMAL
INR PPP: 1.02 (ref 0.85–1.15)

## 2022-06-17 PROCEDURE — 258N000003 HC RX IP 258 OP 636: Performed by: ANESTHESIOLOGY

## 2022-06-17 PROCEDURE — 99217 PR OBSERVATION CARE DISCHARGE: CPT | Performed by: INTERNAL MEDICINE

## 2022-06-17 PROCEDURE — 250N000011 HC RX IP 250 OP 636: Performed by: ANESTHESIOLOGY

## 2022-06-17 PROCEDURE — 36415 COLL VENOUS BLD VENIPUNCTURE: CPT | Performed by: INTERNAL MEDICINE

## 2022-06-17 PROCEDURE — 31622 DX BRONCHOSCOPE/WASH: CPT | Performed by: INTERNAL MEDICINE

## 2022-06-17 PROCEDURE — 710N000009 HC RECOVERY PHASE 1, LEVEL 1, PER MIN: Performed by: INTERNAL MEDICINE

## 2022-06-17 PROCEDURE — 99233 SBSQ HOSP IP/OBS HIGH 50: CPT | Mod: 25 | Performed by: INTERNAL MEDICINE

## 2022-06-17 PROCEDURE — 250N000009 HC RX 250

## 2022-06-17 PROCEDURE — 250N000009 HC RX 250: Performed by: INTERNAL MEDICINE

## 2022-06-17 PROCEDURE — 250N000013 HC RX MED GY IP 250 OP 250 PS 637: Performed by: ANESTHESIOLOGY

## 2022-06-17 PROCEDURE — 85610 PROTHROMBIN TIME: CPT | Performed by: INTERNAL MEDICINE

## 2022-06-17 PROCEDURE — G0378 HOSPITAL OBSERVATION PER HR: HCPCS

## 2022-06-17 PROCEDURE — 360N000076 HC SURGERY LEVEL 3, PER MIN: Performed by: INTERNAL MEDICINE

## 2022-06-17 PROCEDURE — 250N000012 HC RX MED GY IP 250 OP 636 PS 637: Performed by: INTERNAL MEDICINE

## 2022-06-17 PROCEDURE — 999N000157 HC STATISTIC RCP TIME EA 10 MIN

## 2022-06-17 PROCEDURE — 999N000141 HC STATISTIC PRE-PROCEDURE NURSING ASSESSMENT: Performed by: INTERNAL MEDICINE

## 2022-06-17 PROCEDURE — 370N000017 HC ANESTHESIA TECHNICAL FEE, PER MIN: Performed by: INTERNAL MEDICINE

## 2022-06-17 PROCEDURE — 250N000011 HC RX IP 250 OP 636

## 2022-06-17 RX ORDER — KETOROLAC TROMETHAMINE 30 MG/ML
15 INJECTION, SOLUTION INTRAMUSCULAR; INTRAVENOUS EVERY 6 HOURS PRN
Status: DISCONTINUED | OUTPATIENT
Start: 2022-06-17 | End: 2022-06-17 | Stop reason: HOSPADM

## 2022-06-17 RX ORDER — MAGNESIUM SULFATE 4 G/50ML
4 INJECTION INTRAVENOUS ONCE
Status: COMPLETED | OUTPATIENT
Start: 2022-06-17 | End: 2022-06-17

## 2022-06-17 RX ORDER — PREDNISONE 20 MG/1
20 TABLET ORAL DAILY
Status: DISCONTINUED | OUTPATIENT
Start: 2022-06-17 | End: 2022-06-17 | Stop reason: HOSPADM

## 2022-06-17 RX ORDER — PROPOFOL 10 MG/ML
INJECTION, EMULSION INTRAVENOUS PRN
Status: DISCONTINUED | OUTPATIENT
Start: 2022-06-17 | End: 2022-06-17

## 2022-06-17 RX ORDER — MEPERIDINE HYDROCHLORIDE 25 MG/ML
12.5 INJECTION INTRAMUSCULAR; INTRAVENOUS; SUBCUTANEOUS
Status: DISCONTINUED | OUTPATIENT
Start: 2022-06-17 | End: 2022-06-17 | Stop reason: HOSPADM

## 2022-06-17 RX ORDER — ONDANSETRON 2 MG/ML
INJECTION INTRAMUSCULAR; INTRAVENOUS PRN
Status: DISCONTINUED | OUTPATIENT
Start: 2022-06-17 | End: 2022-06-17

## 2022-06-17 RX ORDER — FENTANYL CITRATE 50 UG/ML
25 INJECTION, SOLUTION INTRAMUSCULAR; INTRAVENOUS
Status: DISCONTINUED | OUTPATIENT
Start: 2022-06-17 | End: 2022-06-17 | Stop reason: HOSPADM

## 2022-06-17 RX ORDER — NALOXONE HYDROCHLORIDE 0.4 MG/ML
0.4 INJECTION, SOLUTION INTRAMUSCULAR; INTRAVENOUS; SUBCUTANEOUS
Status: DISCONTINUED | OUTPATIENT
Start: 2022-06-17 | End: 2022-06-17 | Stop reason: HOSPADM

## 2022-06-17 RX ORDER — DEXAMETHASONE SODIUM PHOSPHATE 10 MG/ML
INJECTION, SOLUTION INTRAMUSCULAR; INTRAVENOUS PRN
Status: DISCONTINUED | OUTPATIENT
Start: 2022-06-17 | End: 2022-06-17

## 2022-06-17 RX ORDER — HYDROMORPHONE HYDROCHLORIDE 1 MG/ML
0.4 INJECTION, SOLUTION INTRAMUSCULAR; INTRAVENOUS; SUBCUTANEOUS EVERY 5 MIN PRN
Status: DISCONTINUED | OUTPATIENT
Start: 2022-06-17 | End: 2022-06-17 | Stop reason: HOSPADM

## 2022-06-17 RX ORDER — AMOXICILLIN AND CLAVULANATE POTASSIUM 500; 125 MG/1; MG/1
1 TABLET, FILM COATED ORAL EVERY 12 HOURS
Qty: 10 TABLET | Refills: 0 | Status: SHIPPED | OUTPATIENT
Start: 2022-06-17 | End: 2022-06-22

## 2022-06-17 RX ORDER — AMOXICILLIN AND CLAVULANATE POTASSIUM 500; 125 MG/1; MG/1
1 TABLET, FILM COATED ORAL EVERY 12 HOURS SCHEDULED
Status: DISCONTINUED | OUTPATIENT
Start: 2022-06-17 | End: 2022-06-17 | Stop reason: HOSPADM

## 2022-06-17 RX ORDER — LIDOCAINE 40 MG/G
CREAM TOPICAL
Status: DISCONTINUED | OUTPATIENT
Start: 2022-06-17 | End: 2022-06-17 | Stop reason: HOSPADM

## 2022-06-17 RX ORDER — ALBUTEROL SULFATE 0.83 MG/ML
2.5 SOLUTION RESPIRATORY (INHALATION) ONCE
Status: COMPLETED | OUTPATIENT
Start: 2022-06-17 | End: 2022-06-17

## 2022-06-17 RX ORDER — PREDNISONE 20 MG/1
20 TABLET ORAL DAILY
Qty: 5 TABLET | Refills: 0 | Status: SHIPPED | OUTPATIENT
Start: 2022-06-18 | End: 2022-06-23

## 2022-06-17 RX ORDER — NALOXONE HYDROCHLORIDE 0.4 MG/ML
0.2 INJECTION, SOLUTION INTRAMUSCULAR; INTRAVENOUS; SUBCUTANEOUS
Status: DISCONTINUED | OUTPATIENT
Start: 2022-06-17 | End: 2022-06-17 | Stop reason: HOSPADM

## 2022-06-17 RX ORDER — ONDANSETRON 2 MG/ML
4 INJECTION INTRAMUSCULAR; INTRAVENOUS EVERY 30 MIN PRN
Status: DISCONTINUED | OUTPATIENT
Start: 2022-06-17 | End: 2022-06-17 | Stop reason: HOSPADM

## 2022-06-17 RX ORDER — ONDANSETRON 4 MG/1
4 TABLET, ORALLY DISINTEGRATING ORAL EVERY 30 MIN PRN
Status: DISCONTINUED | OUTPATIENT
Start: 2022-06-17 | End: 2022-06-17 | Stop reason: HOSPADM

## 2022-06-17 RX ORDER — HALOPERIDOL 5 MG/ML
1 INJECTION INTRAMUSCULAR
Status: DISCONTINUED | OUTPATIENT
Start: 2022-06-17 | End: 2022-06-17 | Stop reason: HOSPADM

## 2022-06-17 RX ORDER — ACETAMINOPHEN 325 MG/1
975 TABLET ORAL ONCE
Status: COMPLETED | OUTPATIENT
Start: 2022-06-17 | End: 2022-06-17

## 2022-06-17 RX ORDER — FENTANYL CITRATE 50 UG/ML
INJECTION, SOLUTION INTRAMUSCULAR; INTRAVENOUS PRN
Status: DISCONTINUED | OUTPATIENT
Start: 2022-06-17 | End: 2022-06-17

## 2022-06-17 RX ORDER — SODIUM CHLORIDE, SODIUM LACTATE, POTASSIUM CHLORIDE, CALCIUM CHLORIDE 600; 310; 30; 20 MG/100ML; MG/100ML; MG/100ML; MG/100ML
INJECTION, SOLUTION INTRAVENOUS CONTINUOUS
Status: DISCONTINUED | OUTPATIENT
Start: 2022-06-17 | End: 2022-06-17

## 2022-06-17 RX ORDER — PROPOFOL 10 MG/ML
INJECTION, EMULSION INTRAVENOUS CONTINUOUS PRN
Status: DISCONTINUED | OUTPATIENT
Start: 2022-06-17 | End: 2022-06-17

## 2022-06-17 RX ORDER — LIDOCAINE HYDROCHLORIDE 20 MG/ML
INJECTION, SOLUTION INFILTRATION; PERINEURAL PRN
Status: DISCONTINUED | OUTPATIENT
Start: 2022-06-17 | End: 2022-06-17

## 2022-06-17 RX ORDER — OXYCODONE HYDROCHLORIDE 5 MG/1
5 TABLET ORAL EVERY 4 HOURS PRN
Status: DISCONTINUED | OUTPATIENT
Start: 2022-06-17 | End: 2022-06-17 | Stop reason: HOSPADM

## 2022-06-17 RX ORDER — FENTANYL CITRATE 50 UG/ML
25 INJECTION, SOLUTION INTRAMUSCULAR; INTRAVENOUS EVERY 5 MIN PRN
Status: DISCONTINUED | OUTPATIENT
Start: 2022-06-17 | End: 2022-06-17 | Stop reason: HOSPADM

## 2022-06-17 RX ORDER — MAGNESIUM HYDROXIDE 1200 MG/15ML
LIQUID ORAL PRN
Status: DISCONTINUED | OUTPATIENT
Start: 2022-06-17 | End: 2022-06-17 | Stop reason: HOSPADM

## 2022-06-17 RX ORDER — LORAZEPAM 2 MG/ML
.5-1 INJECTION INTRAMUSCULAR
Status: DISCONTINUED | OUTPATIENT
Start: 2022-06-17 | End: 2022-06-17 | Stop reason: HOSPADM

## 2022-06-17 RX ORDER — SODIUM CHLORIDE, SODIUM LACTATE, POTASSIUM CHLORIDE, CALCIUM CHLORIDE 600; 310; 30; 20 MG/100ML; MG/100ML; MG/100ML; MG/100ML
INJECTION, SOLUTION INTRAVENOUS CONTINUOUS
Status: DISCONTINUED | OUTPATIENT
Start: 2022-06-17 | End: 2022-06-17 | Stop reason: HOSPADM

## 2022-06-17 RX ADMIN — PROPOFOL 150 MG: 10 INJECTION, EMULSION INTRAVENOUS at 14:48

## 2022-06-17 RX ADMIN — PREDNISONE 20 MG: 20 TABLET ORAL at 16:22

## 2022-06-17 RX ADMIN — FENTANYL CITRATE 100 MCG: 50 INJECTION, SOLUTION INTRAMUSCULAR; INTRAVENOUS at 14:39

## 2022-06-17 RX ADMIN — PROPOFOL 200 MCG/KG/MIN: 10 INJECTION, EMULSION INTRAVENOUS at 14:48

## 2022-06-17 RX ADMIN — ALBUTEROL SULFATE 2.5 MG: 2.5 SOLUTION RESPIRATORY (INHALATION) at 13:41

## 2022-06-17 RX ADMIN — LIDOCAINE HYDROCHLORIDE 3 ML: 20 INJECTION, SOLUTION INFILTRATION; PERINEURAL at 14:48

## 2022-06-17 RX ADMIN — SUGAMMADEX 200 MG: 100 INJECTION, SOLUTION INTRAVENOUS at 15:04

## 2022-06-17 RX ADMIN — ONDANSETRON 4 MG: 2 INJECTION INTRAMUSCULAR; INTRAVENOUS at 14:58

## 2022-06-17 RX ADMIN — MAGNESIUM SULFATE HEPTAHYDRATE 4 G: 4 INJECTION, SOLUTION INTRAVENOUS at 13:38

## 2022-06-17 RX ADMIN — SODIUM CHLORIDE, POTASSIUM CHLORIDE, SODIUM LACTATE AND CALCIUM CHLORIDE: 600; 310; 30; 20 INJECTION, SOLUTION INTRAVENOUS at 15:24

## 2022-06-17 RX ADMIN — ACETAMINOPHEN 975 MG: 325 TABLET ORAL at 13:30

## 2022-06-17 RX ADMIN — MIDAZOLAM 2 MG: 1 INJECTION INTRAMUSCULAR; INTRAVENOUS at 14:39

## 2022-06-17 RX ADMIN — SODIUM CHLORIDE, POTASSIUM CHLORIDE, SODIUM LACTATE AND CALCIUM CHLORIDE: 600; 310; 30; 20 INJECTION, SOLUTION INTRAVENOUS at 14:39

## 2022-06-17 RX ADMIN — DEXAMETHASONE SODIUM PHOSPHATE 10 MG: 10 INJECTION, SOLUTION INTRAMUSCULAR; INTRAVENOUS at 14:48

## 2022-06-17 RX ADMIN — ROCURONIUM BROMIDE 40 MG: 50 INJECTION, SOLUTION INTRAVENOUS at 14:48

## 2022-06-17 ASSESSMENT — ACTIVITIES OF DAILY LIVING (ADL): DEPENDENT_IADLS:: INDEPENDENT

## 2022-06-17 NOTE — ANESTHESIA CARE TRANSFER NOTE
Patient: Gloria Webb    Procedure: Procedure(s):  THERAPEUTIC BRONCHOSCOPY       Diagnosis: Aspiration of foreign body, initial encounter [T17.908A]  Diagnosis Additional Information: No value filed.    Anesthesia Type:   General     Note:    Oropharynx: oropharynx clear of all foreign objects and spontaneously breathing  Level of Consciousness: awake  Oxygen Supplementation: face mask  Level of Supplemental Oxygen (L/min / FiO2): 8  Independent Airway: airway patency satisfactory and stable  Dentition: dentition unchanged  Vital Signs Stable: post-procedure vital signs reviewed and stable  Report to RN Given: handoff report given  Patient transferred to: PACU    Handoff Report: Identifed the Patient, Identified the Reponsible Provider, Reviewed the pertinent medical history, Discussed the surgical course, Reviewed Intra-OP anesthesia mangement and issues during anesthesia, Set expectations for post-procedure period and Allowed opportunity for questions and acknowledgement of understanding      Vitals:  Vitals Value Taken Time   /92 06/17/22 1521   Temp 36.5  C (97.7  F) 06/17/22 1520   Pulse 107 06/17/22 1520   Resp 17 06/17/22 1520   SpO2 100 % 06/17/22 1520   Vitals shown include unvalidated device data.    Electronically Signed By: ALBERTO Riggs CRNA  June 17, 2022  3:22 PM

## 2022-06-17 NOTE — DISCHARGE SUMMARY
[facility]  Hospitalist Discharge Summary      Date of Admission:  6/16/2022  Date of Discharge:  6/17/2022  Discharging Provider: Joanne Sauceda MD  Discharge Service: Hospitalist Service    Discharge Diagnoses     Principal Problem:    Aspiration into airway, initial encounter      Follow-ups Needed After Discharge   Follow-up Appointments     Follow-up and recommended labs and tests       Follow up with primary care provider, Netta Mares, within 7 days   for hospital follow- up as needed.  No follow up labs or test are needed.             Discharge Disposition   Discharged to home  Condition at discharge: Stable      Hospital Course     Gloria Webb is a 66 year old female who was sent by PCP due to aspiration of corn kernel that happened 2 days prior to admission. Patient was eating canned corn and talking at the same time. She tried coughing without relief. She went see PCP on 6/16/22 and chest x-ray showed foreign body. Patient was sent to ER. CT chest confirmed foreign body in the bronchus intermedius. Patient was started Rocephin. She underwent bronchoscopy on 6/17. One piece of corn located in RML was removed. Patient recovered well after the procedure. She was discharged home with 5 day course of Augmentin and prednisone.     Consultations This Hospital Stay   None    Code Status   Full Code    Time Spent on this Encounter   I, Joanne Sauceda MD, personally saw the patient today and spent greater than 30 minutes discharging this patient.       Joanne Sauceda MD  [address] [address]  Phone: [phone]  Fax: [phone]  ______________________________________________________________________    Physical Exam   Vital Signs: Temp: 97.7  F (36.5  C) Temp src: Oral BP: 135/86 Pulse: 91   Resp: 16 SpO2: 92 % O2 Device: None (Room air) Oxygen Delivery: 10 LPM  Weight: 131 lbs 0 oz    General appearance: not in acute  distress  HEENT: PERRL, EOMI  Lungs: Clear breath sounds in bilateral lung fields  Cardiovascular: Regular rate and rhythm, normal S1-S2  Abdomen: Soft, non tender, no distension  Musculoskeletal: No joint swelling  Skin: No rash and no edema  Neurology: AAO ×3.  Cranial nerves II - XII normal.  Normal muscle strength in all four extremities.       Primary Care Physician   Netta Mares    Discharge Orders      Reason for your hospital stay    * Foreign body in airway     Follow-up and recommended labs and tests     Follow up with primary care provider, Netta Mares, within 7 days for hospital follow- up as needed.  No follow up labs or test are needed.     Activity    Your activity upon discharge: activity as tolerated     Diet    Follow this diet upon discharge: Regular Diet Adult       Significant Results and Procedures   Most Recent 3 CBC's:Recent Labs   Lab Test 06/16/22  1126 10/26/21  1139   WBC 6.8 8.1   HGB 14.0 13.7   MCV 96 98    335     Most Recent 3 BMP's:Recent Labs   Lab Test 06/16/22  1126 10/26/21  1059 09/04/19  1510    141 141   POTASSIUM 4.2 4.5 4.2   CHLORIDE 104 103 107   CO2 26 24 22   BUN 9 12 9   CR 0.76 0.75 0.83   ANIONGAP 11 14 12   ZACK 9.5 9.9 9.2    121 103       EXAM: CT CHEST W/O CONTRAST  LOCATION: [facility]  DATE/TIME: 6/16/2022 2:28 PM     INDICATION: Corn kernel aspiration 2 days ago. Cough.  COMPARISON: Chest x-ray today.  TECHNIQUE: CT chest without IV contrast. Multiplanar reformats were obtained. Dose reduction techniques were used.  CONTRAST: None.     FINDINGS:   LUNGS AND PLEURA: The bronchus intermedius contains a 6 x 5 x 2 mm soft tissue foreign density just proximal to the bifurcation of the middle and lower lobe bronchi. This could represent an aspirated kernel of corn. This does not completely obstruct the   airway.     Mild right middle and lower lobe atelectasis or scar. No effusion. No  pneumonia.     MEDIASTINUM/AXILLAE: No lymphadenopathy. No thoracic aortic aneurysm.     CORONARY ARTERY CALCIFICATION: Mild.     UPPER ABDOMEN: Diffuse hepatic steatosis. Aortic atherosclerosis.     MUSCULOSKELETAL: Unremarkable.                                                                    IMPRESSION:   1.  Bronchus intermedius 6 mm soft tissue foreign body consistent with aspirated fluid.  2.  Mild right basilar atelectasis.  3.  Hepatic steatosis.      Discharge Medications   Current Discharge Medication List      START taking these medications    Details   amoxicillin-clavulanate (AUGMENTIN) 500-125 MG tablet Take 1 tablet by mouth every 12 hours for 5 days  Qty: 10 tablet, Refills: 0    Associated Diagnoses: Aspiration of foreign body, initial encounter      predniSONE (DELTASONE) 20 MG tablet Take 1 tablet (20 mg) by mouth daily for 5 days  Qty: 5 tablet, Refills: 0    Associated Diagnoses: Aspiration of foreign body, initial encounter           Allergies   No Known Allergies

## 2022-06-17 NOTE — ANESTHESIA POSTPROCEDURE EVALUATION
Patient: Gloria Webb    Procedure: Procedure(s):  THERAPEUTIC BRONCHOSCOPY       Anesthesia Type:  General    Note:  Disposition: Inpatient   Postop Pain Control: Uneventful            Sign Out: Well controlled pain   PONV: No   Neuro/Psych: Uneventful            Sign Out: Acceptable/Baseline neuro status   Airway/Respiratory: Uneventful            Sign Out: Acceptable/Baseline resp. status   CV/Hemodynamics: Uneventful            Sign Out: Acceptable CV status; No obvious hypovolemia; No obvious fluid overload   Other NRE: NONE   DID A NON-ROUTINE EVENT OCCUR? No           Last vitals:  Vitals Value Taken Time   /85 06/17/22 1540   Temp 36.4  C (97.6  F) 06/17/22 1540   Pulse 90 06/17/22 1548   Resp 12 06/17/22 1545   SpO2 92 % 06/17/22 1549   Vitals shown include unvalidated device data.    Electronically Signed By: Oneil Seals MD  June 17, 2022  5:19 PM

## 2022-06-17 NOTE — PROCEDURES
FIBEROPTIC BRONCHOSCOPY PROCEDURE NOTE     Date of Procedure: 06/17/2022  Performing Physician: Elbert Coughlin MD  Pre-Procedure Diagnosis:   Foreign body in airway  Post-Procedure Diagnosis:    1. Small piece of corn removed from RML bronchus   2. Mild pitting of bronchial mucosa    Procedure:  Diagnostic Flexible Fiberoptic Bronchoscopy   Indications:  Gloria Webb is a 66 year old female with history of HTN, tobacco use.   Presents after aspiration of food intake. Chest CT scan showed foreign body in airway.   A therapeutic bronchoscopy is indicated.     Preop evaluation:  Procedure:  Intravenous Sedation.    Expected level:  General anesthesia   ASA Class: 2  Mallampati: II/IV  Anesthesia:    General Anesthesia: See anesthesia flowsheet for details  Specimen:  Piece of corn was removed   Estimated Blood Loss:  0 ml  Complications:  None    Findings:  Vocal Cords , not assess  Trachea , ET tube above fitz  Fitz sharp , normal mucosa  Right Bronchial Tree corn impacted in RML bronchus, mild hyperemic mucosa, mild pitting of mucosa. Corn was removed, mild clear secretions.   Left Bronchial Tree , normal mucosa, mild pitting of mucosa          Procedure Details:   The patient was seen and the risks, benefits, complications, treatment options, and expected outcomes were discussed with PATIENT   . The risks and potential complications of their problem and proposed treatment include but are not limited to infection, bleeding, pain, adverse drug reaction, pulmonary aspiration, the need for additional procedures, failure to diagnose a condition, creating a complication requiring transfusion or operation, and complication secondary to the anesthetic.  The patient/alternate (see above) concurred with the proposed plan, giving informed consent.  The patient was identified as Gloria Webb with Date of Birth 1955 and the procedure verified as Diagnostic Flexible Fiberoptic Bronchoscopy .  A Time Out was  held and the above information confirmed.      After application of topical nasopharyngeal anesthesia, the patient was placed in the supine position and the bronchoscope was passed through the ET tube.  The vocal cords were visualized . The cord findings are noted above.  The scope was then passed into the trachea  Careful inspection of the tracheal lumen was accomplished. The scope was sequentially passed into the left main and then left upper and lower bronchi and segmental bronchi.   Findings and specimen details recorded above.  The scope was then withdrawn and advanced into the right main bronchus and then into the RUL, RML, and RLL bronchi and segmental bronchi.   Findings and specimen details recorded above.     Additional Procedures: Removal of foreign body using bronchoscope     The patient tolerated the procedure well.      Elbert Coughlin MD, 06/17/2022 3:39 PM      Referring Physician: * No referring provider recorded for this case *  Attending Physician: Joanne Sauceda MD  Primary Care Physician: Netta Mares

## 2022-06-17 NOTE — PROGRESS NOTES
Informed RN of patient's OR time today for bronchoscopy. (2PM in OR4)  Confirmed patient has remained NPO.    Ze Farrell, RT  6/17/2022

## 2022-06-17 NOTE — PROGRESS NOTES
Respiratory Therapy Procedure Note    Assisted Dr. Foster  with Bronchoscopy procedure in the OR.  EBUS was done successfully without any complication.  Foreign body (corn kernel) obtained from RML and, per MD and patient agreeing, provided to patient.  Airway managed by CRNA. PT extubated in OR and was transported to PACU.    Ze Farrell, RT  6/17/2022

## 2022-06-17 NOTE — ANESTHESIA PREPROCEDURE EVALUATION
Anesthesia Pre-Procedure Evaluation    Patient: Gloria Webb   MRN: [id number] : 1955        Procedure : Procedure(s):  BRONCHOSCOPY          Past Medical History:   Diagnosis Date     Asthma      High cholesterol       History reviewed. No pertinent surgical history.   No Known Allergies   Social History     Tobacco Use     Smoking status: Former Smoker     Packs/day: 1.50     Years: 44.00     Pack years: 66.00     Quit date: 2017     Years since quittin.0     Smokeless tobacco: Never Used   Substance Use Topics     Alcohol use: Yes     Comment: case of beer per week      Wt Readings from Last 1 Encounters:   22 59.4 kg (131 lb)        Anesthesia Evaluation            ROS/MED HX  ENT/Pulmonary:     (+) asthma     Neurologic:       Cardiovascular:       METS/Exercise Tolerance:     Hematologic:       Musculoskeletal:       GI/Hepatic:       Renal/Genitourinary:       Endo:       Psychiatric/Substance Use:       Infectious Disease:       Malignancy:       Other:            Physical Exam    Airway        Mallampati: II   TM distance: > 3 FB   Neck ROM: full     Respiratory Devices and Support         Dental  no notable dental history         Cardiovascular   cardiovascular exam normal          Pulmonary           (+) decreased breath sounds           OUTSIDE LABS:  CBC:   Lab Results   Component Value Date    WBC 6.8 2022    WBC 8.1 10/26/2021    HGB 14.0 2022    HGB 13.7 10/26/2021    HCT 43.1 2022    HCT 42.0 10/26/2021     2022     10/26/2021     BMP:   Lab Results   Component Value Date     2022     10/26/2021    POTASSIUM 4.2 2022    POTASSIUM 4.5 10/26/2021    CHLORIDE 104 2022    CHLORIDE 103 10/26/2021    CO2 26 2022    CO2 24 10/26/2021    BUN 9 2022    BUN 12 10/26/2021    CR 0.76 2022    CR 0.75 10/26/2021     2022     10/26/2021     COAGS:   Lab Results   Component Value Date     INR 1.02 06/17/2022     POC: No results found for: BGM, HCG, HCGS  HEPATIC:   Lab Results   Component Value Date    ALBUMIN 3.9 10/26/2021    PROTTOTAL 7.3 10/26/2021    ALT 23 10/26/2021    AST 31 10/26/2021    ALKPHOS 81 10/26/2021    BILITOTAL 0.4 10/26/2021     OTHER:   Lab Results   Component Value Date    ZACK 9.5 06/16/2022    LIPASE 30 09/05/2019    TSH 2.78 10/26/2021    T4 0.85 10/26/2021       Anesthesia Plan    ASA Status:  2   NPO Status:  NPO Appropriate    Anesthesia Type: General.     - Airway: ETT   Induction: Intravenous, Propofol.   Maintenance: TIVA.        Consents    Anesthesia Plan(s) and associated risks, benefits, and realistic alternatives discussed. Questions answered and patient/representative(s) expressed understanding.    - Discussed:     - Discussed with:  Patient      - Extended Intubation/Ventilatory Support Discussed: No.      - Patient is DNR/DNI Status: No    Use of blood products discussed: No .     Postoperative Care    Pain management: IV analgesics, Oral pain medications, Multi-modal analgesia.   PONV prophylaxis: Dexamethasone or Solumedrol, Ondansetron (or other 5HT-3)     Comments:    Other Comments: Decadron, Zofran.  Diprivan infusion.  Fentanyl, Versed.            Kirt Aguilar MD

## 2022-06-17 NOTE — PLAN OF CARE
Goal Outcome Evaluation:      Pt is A&Ox4, up indep in room and able to make needs known. Denied pain. Reported SOB related to coughing caused by irritation in the airway. LS clear, sats upper 90's on RA, VSS. Pt transferred for brochoscopy around 115pm  Problem: Plan of Care - These are the overarching goals to be used throughout the patient stay.    Goal: Optimal Comfort and Wellbeing  Outcome: Ongoing, Progressing  Goal: Readiness for Transition of Care  Outcome: Ongoing, Progressing     Problem: Gas Exchange Impaired  Goal: Optimal Gas Exchange  Outcome: Ongoing, Progressing  Intervention: Optimize Oxygenation and Ventilation  Recent Flowsheet Documentation  Taken 6/17/2022 1231 by Jigna Aguilar RN  Head of Bed (HOB) Positioning: HOB at 30 degrees  Taken 6/17/2022 0809 by Jigna Aguilar RN  Head of Bed (HOB) Positioning: HOB at 30 degrees     Problem: Plan of Care - These are the overarching goals to be used throughout the patient stay.    Goal: Absence of Hospital-Acquired Illness or Injury  Intervention: Identify and Manage Fall Risk  Recent Flowsheet Documentation  Taken 6/17/2022 1231 by Jigna Aguilar RN  Safety Promotion/Fall Prevention:   patient and family education   nonskid shoes/slippers when out of bed  Taken 6/17/2022 0809 by Jigna Aguilar RN  Safety Promotion/Fall Prevention:   patient and family education   nonskid shoes/slippers when out of bed  Intervention: Prevent Skin Injury  Recent Flowsheet Documentation  Taken 6/17/2022 1231 by Jigna Aguilar RN  Body Position: position changed independently  Taken 6/17/2022 0809 by Jigna Aguilar RN  Body Position: position changed independently  Intervention: Prevent and Manage VTE (Venous Thromboembolism) Risk  Recent Flowsheet Documentation  Taken 6/17/2022 1231 by Jigna Aguilar RN  Activity Management:   activity encouraged   up ad robert  Taken 6/17/2022 0809 by Jigna Aguilar RN  Activity Management:   activity encouraged   up ad  robert

## 2022-06-17 NOTE — PLAN OF CARE
Problem: Gas Exchange Impaired  Goal: Optimal Gas Exchange  Outcome: Ongoing, Progressing  Intervention: Optimize Oxygenation and Ventilation  Recent Flowsheet Documentation  Taken 6/17/2022 0424 by Yana Maravilla, RN  Head of Bed (HOB) Positioning: HOB at 20 degrees  Taken 6/17/2022 0000 by Yana Maravilla, RN  Head of Bed ([facility]) Positioning: HOB at 20-30 degrees   Goal Outcome Evaluation:  Pt had wheezing upon exhalation to bilateral upper lobes. Maintained NPO status. Pt had a good night sleep. Monitoring continued. Independent with cares and able to call when in need.

## 2022-06-17 NOTE — PLAN OF CARE
Problem: Plan of Care - These are the overarching goals to be used throughout the patient stay.    Goal: Absence of Hospital-Acquired Illness or Injury  Intervention: Identify and Manage Fall Risk  Recent Flowsheet Documentation  Taken 6/16/2022 2157 by Ching Montoya RN  Safety Promotion/Fall Prevention: nonskid shoes/slippers when out of bed  Taken 6/16/2022 1829 by Ching Montoya RN  Safety Promotion/Fall Prevention: nonskid shoes/slippers when out of bed  Intervention: Prevent and Manage VTE (Venous Thromboembolism) Risk  Recent Flowsheet Documentation  Taken 6/16/2022 2157 by Ching Montoya RN  VTE Prevention/Management: SCDs (sequential compression devices) off  Taken 6/16/2022 1829 by Ching Montoya RN  VTE Prevention/Management: SCDs (sequential compression devices) off   Goal Outcome Evaluation:  Pt wheezing has decreased bilat.  She continues to deny any dyspnea.  She is up ambulating ad robert.  BP down to 127/81 after PRN Hydralazine but HR is in 120s.  She states feeling more relaxed.  She understands she is NPO after midnight for bronchoscopy tomorrow.

## 2022-06-17 NOTE — PLAN OF CARE
"PRIMARY DIAGNOSIS: \"GENERIC\" NURSING  OUTPATIENT/OBSERVATION GOALS TO BE MET BEFORE DISCHARGE:  ADLs back to baseline: No    Activity and level of assistance: Ambulating independently.    Pain status: Improved with use of alternative comfort measures i.e.: positioning    Return to near baseline physical activity: No     Discharge Planner Nurse   Safe discharge environment identified: No  Barriers to discharge: Yes       Entered by: Yana Maravilla RN 06/17/2022 6:05 AM   Pt had wheezing on inspiration to bilateral upper lobes, otherwise pt slept good and not disturbed. No c/o pain. VS are WNL. Awaiting bronchoscopy in AM.  Please review provider order for any additional goals.   Nurse to notify provider when observation goals have been met and patient is ready for discharge.Goal Outcome Evaluation:                      "

## 2022-06-17 NOTE — ANESTHESIA PROCEDURE NOTES
Airway       Patient location during procedure: OR       Procedure Start/Stop Times: 6/17/2022 2:54 PM  Staff -        Anesthesiologist:  Oneil Seals MD       CRNA: Don Ruggiero APRN CRNA       Performed By: CRNA  Consent for Airway        Urgency: elective  Indications and Patient Condition       Indications for airway management: rosalia-procedural       Induction type:intravenous       Mask difficulty assessment: 2 - vent by mask + OA or adjuvant +/- NMBA    Final Airway Details       Final airway type: endotracheal airway       Successful airway: ETT - single  Endotracheal Airway Details        ETT size (mm): 8.0       Cuffed: yes       Cuff volume (mL): 8       Successful intubation technique: direct laryngoscopy       DL Blade Type: Conde 2       Grade View of Cords: 1       Adjucts: stylet and tooth guard       Position: Right       Measured from: lips       Secured at (cm): 22       Bite block used: None    Post intubation assessment        Placement verified by: capnometry, equal breath sounds and chest rise        Number of attempts at approach: 1       Number of other approaches attempted: 0       Secured with: commercial tube clemons       Ease of procedure: easy       Dentition: Intact and Unchanged    Medication(s) Administered   Medication Administration Time: 6/17/2022 2:54 PM

## 2022-06-17 NOTE — PROGRESS NOTES
Pt discharged this afternoon at approximately 1735 via personal vehicle accompanied by friend after having a therapeutic bronchoscopy performed today with foreign body removal from right mid lobe. Pt is feeling well and offers no complaints. Discharge instructions were reviewed and all questions were answered. She will  two medications called into her personal pharmacy. Reviewed medication instructions and indications for use. She should follow up with her primary care provider in 7 days for a post hospital visit with no labs or tests indicated at that time. Reviewed symptoms to watch for following her procedure and when to call a physician. She will resume a regular diet. Escorted to waiting vehicle via w/c with all belongings in tow.

## 2022-06-17 NOTE — PLAN OF CARE
Problem: Gas Exchange Impaired  Goal: Optimal Gas Exchange  Outcome: Ongoing, Progressing  Intervention: Optimize Oxygenation and Ventilation  Recent Flowsheet Documentation  Taken 6/17/2022 1231 by Jigna Aguilar RN  Head of Bed (HOB) Positioning: HOB at 30 degrees  Taken 6/17/2022 0809 by Jigna Aguilar RN  Head of Bed (HOB) Positioning: HOB at 30 degrees   Goal Outcome Evaluation:        LS clear, reports some difficulty breathing with coughing. Cough induced by airway irritation, sats upper 90's on RA  Problem: Plan of Care - These are the overarching goals to be used throughout the patient stay.    Goal: Absence of Hospital-Acquired Illness or Injury  Intervention: Identify and Manage Fall Risk  Recent Flowsheet Documentation  Taken 6/17/2022 1231 by Jigna Aguilar RN  Safety Promotion/Fall Prevention:   patient and family education   nonskid shoes/slippers when out of bed  Taken 6/17/2022 0809 by Jigna Aguilar RN  Safety Promotion/Fall Prevention:   patient and family education   nonskid shoes/slippers when out of bed  Intervention: Prevent Skin Injury  Recent Flowsheet Documentation  Taken 6/17/2022 1231 by Jigna Aguilar RN  Body Position: position changed independently  Taken 6/17/2022 0809 by Jigna Aguilar RN  Body Position: position changed independently  Intervention: Prevent and Manage VTE (Venous Thromboembolism) Risk  Recent Flowsheet Documentation  Taken 6/17/2022 1231 by Jigna Aguilar RN  Activity Management:   activity encouraged   up ad robert  Taken 6/17/2022 0809 by Jigna Aguilar RN  Activity Management:   activity encouraged   up ad robert

## 2022-06-18 NOTE — PROGRESS NOTES
Care Management Initial Consult    General Information  Assessment completed with: Gloria Rodriguez  Type of CM/SW Visit: Initial Assessment    Primary Care Provider verified and updated as needed: Yes   Readmission within the last 30 days: no previous admission in last 30 days      Reason for Consult: discharge planning  Advance Care Planning: Advance Care Planning Reviewed: other (see comments) (pt has no HCD and does not want information)          Communication Assessment  Patient's communication style: spoken language (English or Bilingual)    Hearing Difficulty or Deaf: no (has hearing aids did not bring)   Wear Glasses or Blind: yes    Cognitive  Cognitive/Neuro/Behavioral: WDL  Level of Consciousness: alert  Arousal Level: opens eyes spontaneously  Orientation: oriented x 4  Mood/Behavior: cooperative, calm  Best Language: 0 - No aphasia  Speech: clear, logical    Living Environment:   People in home: significant other     Current living Arrangements: house      Able to return to prior arrangements: yes       Family/Social Support:  Care provided by: self  Provides care for: no one  Marital Status: Lives with Significant Other  Significant Other, Children, Other (specify) (other family)       Pedrito  Description of Support System: Supportive, Involved    Support Assessment: Adequate family and caregiver support, Adequate social supports    Current Resources:   Patient receiving home care services: No     Community Resources: None  Equipment currently used at home: none  Supplies currently used at home: None    Employment/Financial:  Employment Status:          Financial Concerns:             Lifestyle & Psychosocial Needs:  Social Determinants of Health     Tobacco Use: Medium Risk     Smoking Tobacco Use: Former Smoker     Smokeless Tobacco Use: Never Used   Alcohol Use: Not on file   Financial Resource Strain: Not on file   Food Insecurity: Not on file   Transportation Needs: Not on file   Physical Activity:  Not on file   Stress: Not on file   Social Connections: Not on file   Intimate Partner Violence: Not on file   Depression: Not on file   Housing Stability: Not on file       Functional Status:  Prior to admission patient needed assistance:   Dependent ADLs:: Independent  Dependent IADLs:: Independent  Assesssment of Functional Status: At functional baseline    Mental Health Status:  Mental Health Status: No Current Concerns       Chemical Dependency Status:  Chemical Dependency Status: No Current Concerns             Values/Beliefs:  Spiritual, Cultural Beliefs, Congregational Practices, Values that affect care:                 Additional Information:    SW met with pt to complete assessment, address JAMES, and discuss discharge planning.  Pt independent at baseline.  Pt denied discharge needs.  Significant other Pedrito to transport.        JESI Taylor LGSW 06/18/22 6:35 AM         Care Management Discharge Note    Discharge Date: 06/17/2022      Discharge Disposition: Home    Discharge Services: None    Discharge DME: None    Discharge Transportation: family or friend will provide    Private pay costs discussed: Not applicable    PAS Confirmation Code:  (N/A)    Patient/family educated on Medicare website which has current facility and service quality ratings: yes    Education Provided on the Discharge Plan:  Yes    Persons Notified of Discharge Plans: patient     Patient/Family in Agreement with the Plan: yes    Handoff Referral Completed: yes    Additional Information: Pt discharging home this evening.  Significant other to transport.  No needs.      JESI Taylor LGSW 06/18/22 6:37 AM

## 2023-08-01 ENCOUNTER — LAB REQUISITION (OUTPATIENT)
Dept: LAB | Facility: CLINIC | Age: 68
End: 2023-08-01
Payer: MEDICARE

## 2023-08-01 DIAGNOSIS — Z00.00 ENCOUNTER FOR GENERAL ADULT MEDICAL EXAMINATION WITHOUT ABNORMAL FINDINGS: ICD-10-CM

## 2023-08-01 DIAGNOSIS — L65.9 NONSCARRING HAIR LOSS, UNSPECIFIED: ICD-10-CM

## 2023-08-01 DIAGNOSIS — E78.2 MIXED HYPERLIPIDEMIA: ICD-10-CM

## 2023-08-01 LAB
ALBUMIN SERPL BCG-MCNC: 4.7 G/DL (ref 3.5–5.2)
ALP SERPL-CCNC: 58 U/L (ref 35–104)
ALT SERPL W P-5'-P-CCNC: 33 U/L (ref 0–50)
ANION GAP SERPL CALCULATED.3IONS-SCNC: 16 MMOL/L (ref 7–15)
AST SERPL W P-5'-P-CCNC: 30 U/L (ref 0–45)
BILIRUB SERPL-MCNC: 0.6 MG/DL
BUN SERPL-MCNC: 15.3 MG/DL (ref 8–23)
CALCIUM SERPL-MCNC: 9.3 MG/DL (ref 8.8–10.2)
CHLORIDE SERPL-SCNC: 102 MMOL/L (ref 98–107)
CHOLEST SERPL-MCNC: 296 MG/DL
CREAT SERPL-MCNC: 0.81 MG/DL (ref 0.51–0.95)
DEPRECATED HCO3 PLAS-SCNC: 22 MMOL/L (ref 22–29)
ERYTHROCYTE [DISTWIDTH] IN BLOOD BY AUTOMATED COUNT: 12.9 % (ref 10–15)
FERRITIN SERPL-MCNC: 752 NG/ML (ref 11–328)
GFR SERPL CREATININE-BSD FRML MDRD: 80 ML/MIN/1.73M2
GLUCOSE SERPL-MCNC: 115 MG/DL (ref 70–99)
HCT VFR BLD AUTO: 43.4 % (ref 35–47)
HDLC SERPL-MCNC: 106 MG/DL
HGB BLD-MCNC: 13.6 G/DL (ref 11.7–15.7)
LDLC SERPL CALC-MCNC: 157 MG/DL
MCH RBC QN AUTO: 30.4 PG (ref 26.5–33)
MCHC RBC AUTO-ENTMCNC: 31.3 G/DL (ref 31.5–36.5)
MCV RBC AUTO: 97 FL (ref 78–100)
NONHDLC SERPL-MCNC: 190 MG/DL
PLATELET # BLD AUTO: 325 10E3/UL (ref 150–450)
POTASSIUM SERPL-SCNC: 4.6 MMOL/L (ref 3.4–5.3)
PROT SERPL-MCNC: 6.8 G/DL (ref 6.4–8.3)
RBC # BLD AUTO: 4.47 10E6/UL (ref 3.8–5.2)
SODIUM SERPL-SCNC: 140 MMOL/L (ref 136–145)
T4 FREE SERPL-MCNC: 0.98 NG/DL (ref 0.9–1.7)
TRIGL SERPL-MCNC: 167 MG/DL
TSH SERPL DL<=0.005 MIU/L-ACNC: 3.47 UIU/ML (ref 0.3–4.2)
WBC # BLD AUTO: 6.4 10E3/UL (ref 4–11)

## 2023-08-01 PROCEDURE — 84443 ASSAY THYROID STIM HORMONE: CPT | Mod: ORL | Performed by: NURSE PRACTITIONER

## 2023-08-01 PROCEDURE — 82728 ASSAY OF FERRITIN: CPT | Mod: ORL | Performed by: NURSE PRACTITIONER

## 2023-08-01 PROCEDURE — 87624 HPV HI-RISK TYP POOLED RSLT: CPT | Mod: ORL | Performed by: NURSE PRACTITIONER

## 2023-08-01 PROCEDURE — 80053 COMPREHEN METABOLIC PANEL: CPT | Mod: ORL | Performed by: NURSE PRACTITIONER

## 2023-08-01 PROCEDURE — 84439 ASSAY OF FREE THYROXINE: CPT | Mod: ORL | Performed by: NURSE PRACTITIONER

## 2023-08-01 PROCEDURE — 85027 COMPLETE CBC AUTOMATED: CPT | Mod: ORL | Performed by: NURSE PRACTITIONER

## 2023-08-01 PROCEDURE — 80061 LIPID PANEL: CPT | Mod: ORL | Performed by: NURSE PRACTITIONER

## 2023-08-01 PROCEDURE — G0145 SCR C/V CYTO,THINLAYER,RESCR: HCPCS | Mod: ORL | Performed by: NURSE PRACTITIONER

## 2023-08-04 LAB
BKR LAB AP GYN ADEQUACY: ABNORMAL
BKR LAB AP GYN INTERPRETATION: ABNORMAL
BKR LAB AP HPV REFLEX: ABNORMAL
BKR LAB AP PREVIOUS ABNL DX: ABNORMAL
BKR LAB AP PREVIOUS ABNORMAL: ABNORMAL
PATH REPORT.COMMENTS IMP SPEC: ABNORMAL
PATH REPORT.COMMENTS IMP SPEC: ABNORMAL
PATH REPORT.RELEVANT HX SPEC: ABNORMAL

## 2023-08-04 PROCEDURE — 88141 CYTOPATH C/V INTERPRET: CPT | Performed by: PATHOLOGY

## 2023-08-07 LAB
HUMAN PAPILLOMA VIRUS 16 DNA: NEGATIVE
HUMAN PAPILLOMA VIRUS 18 DNA: NEGATIVE
HUMAN PAPILLOMA VIRUS FINAL DIAGNOSIS: NORMAL
HUMAN PAPILLOMA VIRUS OTHER HR: NEGATIVE

## 2023-08-29 NOTE — PROGRESS NOTES
PULMONARY / CRITICAL CARE PROGRESS NOTE    Date / Time of Admission:  6/16/2022  2:22 PM    Assessment:     Gloria Webb is a 66 year old female with history of tobacco use.   Presents after aspiration event. CT scan showed foreign body in airway.   A therapeutic bronchoscopy is indicated.     1. Aspiration pneumonia   2. S/p removal of foreign body of airway 6/17  Bronchoscopy 6/17 : Piece of corn located in RML was removed.   3. Tobacco user    Advance Directives:  Full code    Plan:   1. Prednisone 20 mg daily for 5 days   2. Augmentin 500 mg bid for 5 days   3. Albuterol HFA Q4H PRN   4. Smoking cessation counseling     OK to discharge patient home  Pulmonary service will sign off  Please contact me if you have any questions.    Elbert Coughlin  Pulmonary / Critical Care  06/17/2022  3:51 PM          Subjective:   HPI:  Gloria Webb is a 66 year old female with history of tobacco use.   Presents after aspiration event. CT scan showed foreign body in airway.   A therapeutic bronchoscopy is indicated.     Events overnight  - S/p therapeutic bronchoscopy, removal of piece of corn from RML bronchus   - Patient reports cough and wheezes.     Allergies: Patient has no known allergies.     MEDS:  Current Facility-Administered Medications   Medication     [Auto Hold] acetaminophen (TYLENOL) tablet 650 mg    Or     [Auto Hold] acetaminophen (TYLENOL) Suppository 650 mg     amoxicillin-clavulanate (AUGMENTIN) 500-125 MG per tablet 1 tablet     fentaNYL (PF) (SUBLIMAZE) injection 25 mcg     fentaNYL (PF) (SUBLIMAZE) injection 25 mcg     haloperidol lactate (HALDOL) injection 1 mg     [Auto Hold] hydrALAZINE (APRESOLINE) injection 10 mg     HYDROmorphone (PF) (DILAUDID) injection 0.4 mg     ketorolac (TORADOL) injection 15 mg     LORazepam (ATIVAN) injection 0.5-1 mg     [Auto Hold] LORazepam (ATIVAN) tablet 0.5 mg     magnesium sulfate 4 g in 50 mL sterile water (premade)     [Auto Hold] melatonin tablet 1 mg  Pt Kelin Barone ,1937 , SLPF chart was reviewed.  Bupropion was sent to [facility] pharmacy    No This note was not shared with the patient due to reasonable likelihood of causing patient harm     meperidine (DEMEROL) injection 12.5 mg     naloxone (NARCAN) injection 0.2 mg    Or     naloxone (NARCAN) injection 0.4 mg    Or     naloxone (NARCAN) injection 0.2 mg    Or     naloxone (NARCAN) injection 0.4 mg     [Auto Hold] nicotine (NICODERM CQ) 14 MG/24HR 24 hr patch 1 patch     [Auto Hold] nicotine Patch in Place     ondansetron (ZOFRAN ODT) ODT tab 4 mg    Or     ondansetron (ZOFRAN) injection 4 mg     [Auto Hold] ondansetron (ZOFRAN ODT) ODT tab 4 mg    Or     [Auto Hold] ondansetron (ZOFRAN) injection 4 mg     oxyCODONE (ROXICODONE) tablet 5 mg     predniSONE (SHENG) EC tablet 20 mg     [Auto Hold] senna-docusate (SENOKOT-S/PERICOLACE) 8.6-50 MG per tablet 1 tablet    Or     [Auto Hold] senna-docusate (SENOKOT-S/PERICOLACE) 8.6-50 MG per tablet 2 tablet         Objective:   VITALS:  BP (!) 148/85   Pulse 98   Temp 97.6  F (36.4  C) (Temporal)   Resp 15   Ht 1.524 m (5')   Wt 59.4 kg (131 lb)   SpO2 97%   BMI 25.58 kg/m    VENT:  Resp: 15    EXAM:   Gen: awake, alert, no distress  HEENT: pink conjunctiva, moist mucosa, Mallampati II/IV  Neck: no thyromegaly, masses or JVD  Lungs: discrete ronchi and wheezes  CV: regular, no murmurs or gallops appreciated  Abdomen: soft, NT, BS wnl  Ext: no edema  Neuro: CN II-XII intact, non focal       Data Review:  Recent Labs   Lab 06/16/22  1126         06/16/22 11:26   Sodium 141   Potassium 4.2   Chloride 104   Carbon Dioxide 26   Urea Nitrogen 9   Creatinine 0.76   GFR Estimate 87 [1]   Calcium 9.5   Anion Gap 11   Glucose 105   WBC 6.8   Hemoglobin 14.0   Hematocrit 43.1   Platelet Count 298   RBC Count 4.48   MCV 96   MCH 31.3   MCHC 32.5   RDW 12.5   % Neutrophils 66   % Lymphocytes 22   % Monocytes 9   % Eosinophils 1   % Basophils 1     CT CHEST W/O CONTRAST  LOCATION: [facility]  DATE/TIME: 6/16/2022 2:28 PM  INDICATION: Corn kernel aspiration 2 days ago. Cough.  COMPARISON: Chest x-ray today.  FINDINGS:   LUNGS AND  PLEURA: The bronchus intermedius contains a 6 x 5 x 2 mm soft tissue foreign density just proximal to the bifurcation of the middle and lower lobe bronchi. This could represent an aspirated kernel of corn. This does not completely obstruct the  Airway. Mild right middle and lower lobe atelectasis or scar. No effusion. No pneumonia.  MEDIASTINUM/AXILLAE: No lymphadenopathy. No thoracic aortic aneurysm.  CORONARY ARTERY CALCIFICATION: Mild.  UPPER ABDOMEN: Diffuse hepatic steatosis. Aortic atherosclerosis.  MUSCULOSKELETAL: Unremarkable.  IMPRESSION:   1.  Bronchus intermedius 6 mm soft tissue foreign body consistent with aspirated fluid.  2.  Mild right basilar atelectasis.  3.  Hepatic steatosis.    By:  Elbert Coughlin MD, 06/17/2022  3:51 PM    Primary Care Physician:  Netta Mares

## 2023-09-29 ENCOUNTER — LAB REQUISITION (OUTPATIENT)
Dept: LAB | Facility: CLINIC | Age: 68
End: 2023-09-29
Payer: MEDICARE

## 2023-09-29 DIAGNOSIS — M81.0 AGE-RELATED OSTEOPOROSIS WITHOUT CURRENT PATHOLOGICAL FRACTURE: ICD-10-CM

## 2023-09-29 LAB
ALBUMIN SERPL BCG-MCNC: 4.6 G/DL (ref 3.5–5.2)
ALP SERPL-CCNC: 62 U/L (ref 35–104)
ALT SERPL W P-5'-P-CCNC: 46 U/L (ref 0–50)
ANION GAP SERPL CALCULATED.3IONS-SCNC: 16 MMOL/L (ref 7–15)
AST SERPL W P-5'-P-CCNC: 41 U/L (ref 0–45)
BILIRUB SERPL-MCNC: 0.4 MG/DL
BUN SERPL-MCNC: 12.9 MG/DL (ref 8–23)
CALCIUM SERPL-MCNC: 9.7 MG/DL (ref 8.8–10.2)
CHLORIDE SERPL-SCNC: 104 MMOL/L (ref 98–107)
CREAT SERPL-MCNC: 1.1 MG/DL (ref 0.51–0.95)
DEPRECATED HCO3 PLAS-SCNC: 22 MMOL/L (ref 22–29)
EGFRCR SERPLBLD CKD-EPI 2021: 55 ML/MIN/1.73M2
GLUCOSE SERPL-MCNC: 101 MG/DL (ref 70–99)
PHOSPHATE SERPL-MCNC: 4.1 MG/DL (ref 2.5–4.5)
POTASSIUM SERPL-SCNC: 4.9 MMOL/L (ref 3.4–5.3)
PROT SERPL-MCNC: 7.1 G/DL (ref 6.4–8.3)
PTH-INTACT SERPL-MCNC: 27 PG/ML (ref 15–65)
SODIUM SERPL-SCNC: 142 MMOL/L (ref 135–145)
VIT D+METAB SERPL-MCNC: 19 NG/ML (ref 20–50)

## 2023-09-29 PROCEDURE — 84100 ASSAY OF PHOSPHORUS: CPT | Mod: ORL | Performed by: NURSE PRACTITIONER

## 2023-09-29 PROCEDURE — 83970 ASSAY OF PARATHORMONE: CPT | Mod: ORL | Performed by: NURSE PRACTITIONER

## 2023-09-29 PROCEDURE — 80053 COMPREHEN METABOLIC PANEL: CPT | Mod: ORL | Performed by: NURSE PRACTITIONER

## 2023-09-29 PROCEDURE — 82306 VITAMIN D 25 HYDROXY: CPT | Mod: ORL | Performed by: NURSE PRACTITIONER

## 2025-01-06 ENCOUNTER — LAB REQUISITION (OUTPATIENT)
Dept: LAB | Facility: CLINIC | Age: 70
End: 2025-01-06
Payer: COMMERCIAL

## 2025-01-06 DIAGNOSIS — E78.2 MIXED HYPERLIPIDEMIA: ICD-10-CM

## 2025-01-06 DIAGNOSIS — B18.2 CHRONIC VIRAL HEPATITIS C (H): ICD-10-CM

## 2025-01-06 DIAGNOSIS — R79.89 OTHER SPECIFIED ABNORMAL FINDINGS OF BLOOD CHEMISTRY: ICD-10-CM

## 2025-01-06 PROCEDURE — 80061 LIPID PANEL: CPT | Mod: ORL | Performed by: NURSE PRACTITIONER

## 2025-01-06 PROCEDURE — 80053 COMPREHEN METABOLIC PANEL: CPT | Mod: ORL | Performed by: NURSE PRACTITIONER

## 2025-01-06 PROCEDURE — 82652 VIT D 1 25-DIHYDROXY: CPT | Mod: ORL | Performed by: NURSE PRACTITIONER

## 2025-01-06 PROCEDURE — 87522 HEPATITIS C REVRS TRNSCRPJ: CPT | Mod: ORL | Performed by: NURSE PRACTITIONER

## 2025-01-07 LAB
ALBUMIN SERPL BCG-MCNC: 4.3 G/DL (ref 3.5–5.2)
ALP SERPL-CCNC: 57 U/L (ref 40–150)
ALT SERPL W P-5'-P-CCNC: 31 U/L (ref 0–50)
ANION GAP SERPL CALCULATED.3IONS-SCNC: 13 MMOL/L (ref 7–15)
AST SERPL W P-5'-P-CCNC: 30 U/L (ref 0–45)
BILIRUB SERPL-MCNC: 0.5 MG/DL
BUN SERPL-MCNC: 13.9 MG/DL (ref 8–23)
CALCIUM SERPL-MCNC: 9.2 MG/DL (ref 8.8–10.4)
CHLORIDE SERPL-SCNC: 106 MMOL/L (ref 98–107)
CHOLEST SERPL-MCNC: 306 MG/DL
CREAT SERPL-MCNC: 0.77 MG/DL (ref 0.51–0.95)
EGFRCR SERPLBLD CKD-EPI 2021: 84 ML/MIN/1.73M2
FASTING STATUS PATIENT QL REPORTED: YES
FASTING STATUS PATIENT QL REPORTED: YES
GLUCOSE SERPL-MCNC: 103 MG/DL (ref 70–99)
HCO3 SERPL-SCNC: 23 MMOL/L (ref 22–29)
HCV RNA SERPL NAA+PROBE-ACNC: NOT DETECTED IU/ML
HDLC SERPL-MCNC: 101 MG/DL
LDLC SERPL CALC-MCNC: 174 MG/DL
NONHDLC SERPL-MCNC: 205 MG/DL
POTASSIUM SERPL-SCNC: 4 MMOL/L (ref 3.4–5.3)
PROT SERPL-MCNC: 6.9 G/DL (ref 6.4–8.3)
SODIUM SERPL-SCNC: 142 MMOL/L (ref 135–145)
TRIGL SERPL-MCNC: 154 MG/DL

## 2025-01-08 LAB — 1,25(OH)2D SERPL-MCNC: 41.7 PG/ML (ref 19.9–79.3)

## 2025-07-29 NOTE — ED NOTES
"[facility] ED Handoff Report    ED Chief Complaint: aspiration    ED Diagnosis:  (T17.908A) Aspiration into airway, initial encounter  Comment: piece of corn stuck for 3 days  Plan: Bronchoscopy       PMH:    Past Medical History:   Diagnosis Date     Asthma      High cholesterol         Code Status:  No Order     Falls Risk: No Band: Not applicable    Current Living Situation/Residence: lives alone     Elimination Status: Continent: Yes     Activity Level: Independent    Patients Preferred Language:  English     Needed: No    Vital Signs:  BP (!) 188/99   Pulse 89   Temp 97.7  F (36.5  C) (Tympanic)   Resp 14   Wt 59.4 kg (131 lb)   SpO2 97%   BMI 25.58 kg/m       Cardiac Rhythm: n/a    Pain Score: 0/10    Is the Patient Confused:  No    Last Food or Drink: 06/16/22 at dinner ordered from cafeteria    Focused Assessment:  Pt alert, oriented, calm and cooperative. Reports eating corn couple days ago and a kernel \"went through the wrong pipe\". Breath sounds clear in all auscultated fields. Pt will undergo bronchoscopy tomorrow morning.     Tests Performed: Done: Labs and Imaging    Treatments Provided:  IV Antibiotic     Family Dynamics/Concerns: No    Family Updated On Visitor Policy: Yes    Plan of Care Communicated to Family: Yes    Who Was Updated about Plan of Care: BoyfrienPedrito bernstein [phone]    Belongings Checklist Done and Signed by Patient: Yes    Medications sent with patient: n/a    Covid: asymptomatic , negative    Additional Information: dinner called in. Pt aware of NPO status after midnight.    RN: Amara Alan RN    6/16/2022 5:23 PM     " Few excoriated areas.  Asked her to send us a picture of lesion as it first erupts.  We may then get better idea of rash.

## (undated) RX ORDER — LIDOCAINE HYDROCHLORIDE 10 MG/ML
INJECTION, SOLUTION EPIDURAL; INFILTRATION; INTRACAUDAL; PERINEURAL
Status: DISPENSED
Start: 2022-06-17

## (undated) RX ORDER — PROPOFOL 10 MG/ML
INJECTION, EMULSION INTRAVENOUS
Status: DISPENSED
Start: 2022-06-17

## (undated) RX ORDER — ONDANSETRON 2 MG/ML
INJECTION INTRAMUSCULAR; INTRAVENOUS
Status: DISPENSED
Start: 2022-06-17

## (undated) RX ORDER — LIDOCAINE HYDROCHLORIDE AND EPINEPHRINE 10; 10 MG/ML; UG/ML
INJECTION, SOLUTION INFILTRATION; PERINEURAL
Status: DISPENSED
Start: 2022-06-17

## (undated) RX ORDER — FENTANYL CITRATE 50 UG/ML
INJECTION, SOLUTION INTRAMUSCULAR; INTRAVENOUS
Status: DISPENSED
Start: 2022-06-17